# Patient Record
Sex: MALE | Race: WHITE | NOT HISPANIC OR LATINO | Employment: OTHER | URBAN - METROPOLITAN AREA
[De-identification: names, ages, dates, MRNs, and addresses within clinical notes are randomized per-mention and may not be internally consistent; named-entity substitution may affect disease eponyms.]

---

## 2017-01-03 ENCOUNTER — HOSPITAL ENCOUNTER (OUTPATIENT)
Dept: PHYSICAL THERAPY | Facility: HOSPITAL | Age: 67
Setting detail: THERAPIES SERIES
Discharge: HOME | End: 2017-01-03

## 2017-01-03 DIAGNOSIS — G89.29 CHRONIC BILATERAL LOW BACK PAIN WITH LEFT-SIDED SCIATICA: Primary | ICD-10-CM

## 2017-01-03 DIAGNOSIS — M54.42 CHRONIC BILATERAL LOW BACK PAIN WITH LEFT-SIDED SCIATICA: Primary | ICD-10-CM

## 2017-01-03 PROCEDURE — 97110 THERAPEUTIC EXERCISES: CPT

## 2017-01-03 NOTE — PROGRESS NOTES
Outpatient Physical Therapy Ortho Treatment Note  [facility]     Patient Name: Eugene Ames  : 1950  MRN: [id number]  Today's Date: 1/3/2017      Visit Date: 2017    Visit Dx:    ICD-10-CM ICD-9-CM   1. Chronic bilateral low back pain with left-sided sciatica M54.42 724.2    G89.29 724.3     338.29       There is no problem list on file for this patient.       Past Medical History   Diagnosis Date   • Allergic rhinitis    • ED (erectile dysfunction)    • Extremity pain    • Joint pain    • Low back pain    • Lumbosacral disc disease         Past Surgical History   Procedure Laterality Date   • Lumbar discectomy  2015     Left L4/5 (Sun)                             PT Assessment/Plan       17 1527 16 1346       PT Assessment    Assessment Comments Patient seems to tolerate new routine without increased pain.    -GB Patient is a little concerned about 3 successive days of travel, but he seems encouraged by slight progress and use of ice to control pain.  -GB     PT Plan    PT Plan Comments Follow up with care.  -GB Continue to progress.    -GB       User Key  (r) = Recorded By, (t) = Taken By, (c) = Cosigned By    Initials Name Provider Type    PIOTR Romero, PT Physical Therapist                    Exercises       17 1400          Subjective Comments    Subjective Comments Patient reports that he had difficulty from travels, but he is still hopeful to get an injection in before he leaves for [place] at the end of the month.  -GB      Subjective Pain    Pre-Treatment Pain Level 4  -GB      Post-Treatment Pain Level 5  -GB      Exercise 1    Exercise Name 1 Ther ex as per flow sheet with 3 alternating sets of 3 ''Wads'  -GB      Time (Minutes) 1 45  -GB      Treatment Type 1 Ther Ex  -GB        User Key  (r) = Recorded By, (t) = Taken By, (c) = Cosigned By    Initials Name Provider Type    PIOTR Romero, PT Physical Therapist                                PT OP Goals       12/27/16 1521          Time Calculation    PT Goal Re-Cert Due Date 01/12/16  -RB        User Key  (r) = Recorded By, (t) = Taken By, (c) = Cosigned By    Initials Name Provider Type    RB Edith Dowell, PT Physical Therapist                    Time Calculation:   Start Time: 1345  Total Timed Code Minutes- PT: 45 minute(s)    Therapy Charges for Today     Code Description Service Date Service Provider Modifiers Qty    85157507587 HC PT THER PROC EA 15 MIN 1/3/2017 Shahbaz Romero, PT GP 3                    Shahbaz Romero, PT  1/3/2017

## 2017-01-04 DIAGNOSIS — M51.26 HERNIATED LUMBAR INTERVERTEBRAL DISC: ICD-10-CM

## 2017-01-04 RX ORDER — DICLOFENAC SODIUM 75 MG/1
75 TABLET, DELAYED RELEASE ORAL 2 TIMES DAILY
Qty: 30 TABLET | Refills: 1 | Status: SHIPPED | OUTPATIENT
Start: 2017-01-04 | End: 2017-03-08 | Stop reason: SDUPTHER

## 2017-01-04 NOTE — TELEPHONE ENCOUNTER
Provider:  Dino  Caller:     Phone #:    Surgery:  L4-5 Discectomy  Surgery Date:  8/2015  Last visit:  12/3/16     SUNITA:         Reason for call:       Diclofenac refill request

## 2017-01-05 ENCOUNTER — HOSPITAL ENCOUNTER (OUTPATIENT)
Dept: PHYSICAL THERAPY | Facility: HOSPITAL | Age: 67
Setting detail: THERAPIES SERIES
Discharge: HOME | End: 2017-01-05

## 2017-01-05 DIAGNOSIS — G89.29 CHRONIC BILATERAL LOW BACK PAIN WITH LEFT-SIDED SCIATICA: Primary | ICD-10-CM

## 2017-01-05 DIAGNOSIS — M54.42 CHRONIC BILATERAL LOW BACK PAIN WITH LEFT-SIDED SCIATICA: Primary | ICD-10-CM

## 2017-01-05 PROCEDURE — 97110 THERAPEUTIC EXERCISES: CPT

## 2017-01-05 NOTE — PROGRESS NOTES
Outpatient Physical Therapy Ortho Treatment Note  [facility]     Patient Name: Eugene Ames  : 1950  MRN: [id number]  Today's Date: 2017      Visit Date: 2017    Visit Dx:    ICD-10-CM ICD-9-CM   1. Chronic bilateral low back pain with left-sided sciatica M54.42 724.2    G89.29 724.3     338.29       There is no problem list on file for this patient.       Past Medical History   Diagnosis Date   • Allergic rhinitis    • ED (erectile dysfunction)    • Extremity pain    • Joint pain    • Low back pain    • Lumbosacral disc disease         Past Surgical History   Procedure Laterality Date   • Lumbar discectomy  2015     Left L4/5 (Sun)                             PT Assessment/Plan       17 1557 17 1527       PT Assessment    Assessment Comments Patient has only 2 episodes of increased leg symptoms with push ups and rows, likely from moving lumbar spine into extended position.  -GB Patient seems to tolerate new routine without increased pain.    -GB     PT Plan    PT Plan Comments Continue with current POC.  -GB Follow up with care.  -GB       User Key  (r) = Recorded By, (t) = Taken By, (c) = Cosigned By    Initials Name Provider Type    PIOTR Romero, PT Physical Therapist                    Exercises       17 1300          Subjective Comments    Subjective Comments Patient reports that the duration of his pain does not last as long.  He has required less medication to help control pain.  -GB      Subjective Pain    Pre-Treatment Pain Level 4  -GB      Post-Treatment Pain Level 4  -GB      Exercise 1    Exercise Name 1 Ther ex as per flow sheet with 3 alternating sets of 3 ''Wads'  -GB      Time (Minutes) 1 45  -GB      Treatment Type 1 Ther Ex  -GB        User Key  (r) = Recorded By, (t) = Taken By, (c) = Cosigned By    Initials Name Provider Type    PIOTR Romero, PT Physical Therapist                        Manual Rx (last 36 hours)      Manual  Treatments       01/05/17 1300          Manual Rx 1    Manual Rx 1 Location Hip/Pelvis  -GB      Manual Rx 1 Type Roller (with 6 inch foam roll)  -GB      Manual Rx 1 Duration 1 min  -GB      Manual Rx 2    Manual Rx 2 Location Left LE nerve flossing  -GB      Manual Rx 2 Duration 6 reps  -GB        User Key  (r) = Recorded By, (t) = Taken By, (c) = Cosigned By    Initials Name Provider Type    PIOTR Romero, PT Physical Therapist                PT OP Goals       12/27/16 1521          Time Calculation    PT Goal Re-Cert Due Date 01/12/16  -RB        User Key  (r) = Recorded By, (t) = Taken By, (c) = Cosigned By    Initials Name Provider Type    RB Edith Dowell, PT Physical Therapist                Therapy Education       01/05/17 1556    Therapy Education    Given HEP   written flow sheet of today's program issued to patient  -GB    Program Modified  -GB    How Provided Verbal;Written  -GB    Provided to Patient  -GB    Level of Understanding Verbalized  -GB      User Key  (r) = Recorded By, (t) = Taken By, (c) = Cosigned By    Initials Name Provider Type    PIOTR Romero, PT Physical Therapist                Time Calculation:   Start Time: 1257  Total Timed Code Minutes- PT: 47 minute(s)    Therapy Charges for Today     Code Description Service Date Service Provider Modifiers Qty    80721137269 HC PT THER PROC EA 15 MIN 1/5/2017 Shahbaz Romero, PT GP 3                    Shahbaz Romero, PT  1/5/2017

## 2017-01-10 ENCOUNTER — HOSPITAL ENCOUNTER (OUTPATIENT)
Dept: PHYSICAL THERAPY | Facility: HOSPITAL | Age: 67
Setting detail: THERAPIES SERIES
Discharge: HOME | End: 2017-01-10

## 2017-01-10 PROCEDURE — 97110 THERAPEUTIC EXERCISES: CPT

## 2017-01-10 NOTE — PROGRESS NOTES
Outpatient Physical Therapy Ortho Treatment Note  [facility]     Patient Name: Eugene Ames  : 1950  MRN: [id number]  Today's Date: 1/10/2017      Visit Date: 01/10/2017    Visit Dx:  No diagnosis found.    There is no problem list on file for this patient.       Past Medical History   Diagnosis Date   • Allergic rhinitis    • ED (erectile dysfunction)    • Extremity pain    • Joint pain    • Low back pain    • Lumbosacral disc disease         Past Surgical History   Procedure Laterality Date   • Lumbar discectomy  2015     Left L4/5 (Sun)                             PT Assessment/Plan       01/10/17 1347 17 1557 17 1527    PT Assessment    Assessment Comments Patient was able to complete routine today without any observed distress and without any limitations.  -GB Patient has only 2 episodes of increased leg symptoms with push ups and rows, likely from moving lumbar spine into extended position.  -GB Patient seems to tolerate new routine without increased pain.    -GB    PT Plan    PT Plan Comments Follow up with care.  -GB Continue with current POC.  -GB Follow up with care.  -GB      User Key  (r) = Recorded By, (t) = Taken By, (c) = Cosigned By    Initials Name Provider Type    PIOTR Romero, PT Physical Therapist                    Exercises       01/10/17 1300          Subjective Comments    Subjective Comments Patient did not have a response from the MD office regarding a follow up injection.    -GB      Subjective Pain    Pre-Treatment Pain Level 4  -GB      Post-Treatment Pain Level 4  -GB      Exercise 1    Exercise Name 1 Ther ex as per flow sheet with 3 alternating sets of 3 ''Wads'  -GB      Time (Minutes) 1 45  -GB      Treatment Type 1 Ther Ex  -GB        User Key  (r) = Recorded By, (t) = Taken By, (c) = Cosigned By    Initials Name Provider Type    PIOTR Romero, PT Physical Therapist                                   Therapy Education        01/05/17 1556    Therapy Education    Given HEP   written flow sheet of today's program issued to patient  -GB    Program Modified  -GB    How Provided Verbal;Written  -GB    Provided to Patient  -GB    Level of Understanding Verbalized  -GB      User Key  (r) = Recorded By, (t) = Taken By, (c) = Cosigned By    Initials Name Provider Type    PIOTR Romero, PT Physical Therapist                Time Calculation:   Start Time: 1300  Total Timed Code Minutes- PT: 45 minute(s)    Therapy Charges for Today     Code Description Service Date Service Provider Modifiers Qty    83213938343 HC PT THER PROC EA 15 MIN 1/10/2017 Shahbaz Romero, PT GP 3                    Shahbaz Romero, PT  1/10/2017

## 2017-01-12 ENCOUNTER — HOSPITAL ENCOUNTER (OUTPATIENT)
Dept: PHYSICAL THERAPY | Facility: HOSPITAL | Age: 67
Setting detail: THERAPIES SERIES
Discharge: HOME | End: 2017-01-12

## 2017-01-12 DIAGNOSIS — G89.29 CHRONIC BILATERAL LOW BACK PAIN WITH LEFT-SIDED SCIATICA: Primary | ICD-10-CM

## 2017-01-12 DIAGNOSIS — M54.42 CHRONIC BILATERAL LOW BACK PAIN WITH LEFT-SIDED SCIATICA: Primary | ICD-10-CM

## 2017-01-12 PROCEDURE — G8979 MOBILITY GOAL STATUS: HCPCS

## 2017-01-12 PROCEDURE — 97110 THERAPEUTIC EXERCISES: CPT

## 2017-01-12 PROCEDURE — G8978 MOBILITY CURRENT STATUS: HCPCS

## 2017-01-12 PROCEDURE — G8981 BODY POS CURRENT STATUS: HCPCS

## 2017-01-12 PROCEDURE — G8982 BODY POS GOAL STATUS: HCPCS

## 2017-01-12 PROCEDURE — 97140 MANUAL THERAPY 1/> REGIONS: CPT

## 2017-01-12 NOTE — PROGRESS NOTES
Outpatient Physical Therapy Ortho Re-Assessment  [facility]     Patient Name: Eugene Ames  : 1950  MRN: [id number]  Today's Date: 2017      Visit Date: 2017    There is no problem list on file for this patient.       Past Medical History   Diagnosis Date   • Allergic rhinitis    • ED (erectile dysfunction)    • Extremity pain    • Joint pain    • Low back pain    • Lumbosacral disc disease         Past Surgical History   Procedure Laterality Date   • Lumbar discectomy  2015     Left L4/5 (Sun)       Visit Dx:     ICD-10-CM ICD-9-CM   1. Chronic bilateral low back pain with left-sided sciatica M54.42 724.2    G89.29 724.3     338.29                 PT Ortho       17 1500    Subjective Comments    Subjective Comments Patient feels that he is improved by 25%-50%.  -GB    Subjective Pain    Able to rate subjective pain? yes  -GB    Pre-Treatment Pain Level 2  -GB    Post-Treatment Pain Level 2  -GB    Myotomal Screen- Lower Quarter Clearing    Hip flexion (L2) Bilateral:;5 (Normal)  -GB    Knee extension (L3) Bilateral:;5 (Normal)  -GB    Ankle DF (L4) Bilateral:;5 (Normal)  -GB    Great toe extension (L5) Bilateral:;5 (Normal)  -GB    Ankle PF (S1) Bilateral:;5 (Normal)  -GB    Knee flexion (S2) Right:;5 (Normal);Left:;4+ (Good +)  -GB    SI/Hip Screen- Lower Quarter Clearing    ASIS compression Bilateral:;Negative  -GB    Lumbosacral Palpation    SI --   Patient is without tenderness  -GB    Lumbosacral Segment Left:;Tender;Guarded/taut   Minimal left rotation at L4,5  -GB    Lumbosacral Accessory Motions    Lumbosacral Accessory Motions Tested? --   Increased left leg symptoms with extension and left lat flex  -GB    Trunk    Flexion AROM Deficit 55  -GB    Extension AROM Deficit 32  -GB    Lt Lat Flexion AROM Deficit 25  -GB    Right Lateral Flexion AROM Deficit 30  -GB    Lt Rotation AROM Deficit 30  -GB    Right Rotation AROM Deficit 30  -GB    MMT (Manual Muscle Testing)     General MMT Assessment Detail (B) Hip ABDuction = 5/5, (B) Adduction = 5/5  -GB    Lower Extremity Flexibility    Hamstrings Bilateral:;Moderately limited  -GB      User Key  (r) = Recorded By, (t) = Taken By, (c) = Cosigned By    Initials Name Provider Type    PIOTR Romero, GOMEZ Physical Therapist                            Therapy Education       01/12/17 1607    Therapy Education    Given Symptoms/condition management   Options and consdierations in POC with PT, HEP and follow up with MD CHIN    Program Reinforced  -GB    How Provided Verbal  -GB    Provided to Patient  -GB    Level of Understanding Verbalized;Teach back education performed  -GB      User Key  (r) = Recorded By, (t) = Taken By, (c) = Cosigned By    Initials Name Provider Type    PIOTR Romero, GOMEZ Physical Therapist                PT OP Goals       01/12/17 1600          PT Short Term Goals    STG Date to Achieve 12/29/16  -GB      STG 1 Patient is independent with HEP for flexibility and initial strengthening.  -GB      STG 1 Progress Met  -GB      STG 2 Patient is able to diminish or centralize left leg symptoms 25% of occurrence.  -GB      STG 2 Progress Ongoing;Partially Met  -GB      Long Term Goals    LTG Date to Achieve 01/27/17  -GB      LTG 1 Patient is independent with long term HEP for strengthening and stabilization.  -GB      LTG 1 Progress Partially Met  -GB      LTG 2 Pain is reduced by at least 50% with frequency or intensity of symptoms.  -GB      LTG 2 Progress Progressing;Partially Met  -GB      LTG 2 Progress Comments Reports 25%-50% improved  -GB      LTG 3 Patient LEFS score is improved by at least 20 points.  -GB      LTG 3 Progress Ongoing;Not Met  -GB      Time Calculation    PT Goal Re-Cert Due Date 02/10/17  -GB        User Key  (r) = Recorded By, (t) = Taken By, (c) = Cosigned By    Initials Name Provider Type    PIOTR Romero PT Physical Therapist                PT Assessment/Plan        01/12/17 1611 01/10/17 1347       PT Assessment    Functional Limitations Performance in self-care ADL;Limitations in community activities;Limitations in functional capacity and performance;Impaired gait;Limitation in home management;Performance in leisure activities  -GB      Impairments Range of motion;Joint mobility;Pain;Muscle strength;Sensation;Peripheral nerve integrity;Endurance  -GB      Assessment Comments Patient has shown some signs of progress, but he still has altered symptoms in left leg, most commonly recreated with extension and left lateral flexion motions.  -GB Patient was able to complete routine today without any observed distress and without any limitations.  -GB     Please refer to paper survey for additional self-reported information Yes  -GB      Rehab Potential Good  -GB      Patient/caregiver participated in establishment of treatment plan and goals Yes  -GB      Patient would benefit from skilled therapy intervention Yes  -GB      PT Plan    PT Frequency 1x/week  -GB      Predicted Duration of Therapy Intervention (days/wks) 3 weeks  -GB      PT Plan Comments Finalize HEP and switch emphasis to HEP in preparation for travels to [place].  -GB Follow up with care.  -GB       User Key  (r) = Recorded By, (t) = Taken By, (c) = Cosigned By    Initials Name Provider Type    PIOTR Romero, PT Physical Therapist                  Exercises       01/12/17 1500          Subjective Comments    Subjective Comments Patient feels that he is improved by 25%-50%.  -GB      Subjective Pain    Able to rate subjective pain? yes  -GB      Pre-Treatment Pain Level 2  -GB      Post-Treatment Pain Level 2  -GB        User Key  (r) = Recorded By, (t) = Taken By, (c) = Cosigned By    Initials Name Provider Type    PIOTR Romero, PT Physical Therapist           Manual Rx (last 36 hours)      Manual Treatments       01/12/17 1500          Manual Rx 1    Manual Rx 1 Location Pelvis  -GB      Manual  Rx 1 Type Ham/quad for minimal left posterior rotation of [place]  -GB      Manual Rx 2    Manual Rx 2 Location Left leg distraction  -GB      Manual Rx 2 Duration 2 min  -GB      Manual Rx 3    Manual Rx 3 Location L4, L5  -GB      Manual Rx 3 Type MET for left rotation at L4-5  -GB      Manual Rx 3 Duration 2 x 4  -GB        User Key  (r) = Recorded By, (t) = Taken By, (c) = Cosigned By    Initials Name Provider Type    PIOTR Romero, PT Physical Therapist                            Time Calculation:   Start Time: 1300  Total Timed Code Minutes- PT: 40 minute(s)     Therapy Charges for Today     Code Description Service Date Service Provider Modifiers Qty    63367929642 HC PT MOBILITY CURRENT 1/12/2017 Shahbaz Romero, PT GP, CL 1    78719759011 HC PT MOBILITY PROJECTED 1/12/2017 Shahbaz Romero, PT GP, CK 1    49497290411 HC PT CHNG MAIN POS CURRENT 1/12/2017 Shahbaz Romero, PT GP, CL 1    11313826945 HC PT CHNG MAIN POS PROJECTED 1/12/2017 Shahbaz Romero, PT GP, CK 1    91920944046 HC PT THER PROC EA 15 MIN 1/12/2017 Shahbaz Romero, PT GP 2    56127068405 HC PT MANUAL THERAPY EA 15 MIN 1/12/2017 Shahbaz Romero, PT GP 1          PT G-Codes  PT Professional Judgement Used?: Yes  Outcome Measure Options: Lower Extremity Functional Scale (LEFS)  Score: 19/80  Functional Limitation: Changing and maintaining body position, Mobility: Walking and moving around  Mobility: Walking and Moving Around Current Status (): At least 60 percent but less than 80 percent impaired, limited or restricted  Mobility: Walking and Moving Around Goal Status (): At least 40 percent but less than 60 percent impaired, limited or restricted  Changing and Maintaining Body Position Current Status (): At least 60 percent but less than 80 percent impaired, limited or restricted  Changing and Maintaining Body Position Goal Status (): At least 40 percent but less than 60 percent impaired,  limited or restricted         Shahbaz Romero, PT  1/12/2017

## 2017-01-17 ENCOUNTER — HOSPITAL ENCOUNTER (OUTPATIENT)
Dept: PHYSICAL THERAPY | Facility: HOSPITAL | Age: 67
Setting detail: THERAPIES SERIES
Discharge: HOME | End: 2017-01-17

## 2017-01-17 DIAGNOSIS — M54.42 CHRONIC BILATERAL LOW BACK PAIN WITH LEFT-SIDED SCIATICA: Primary | ICD-10-CM

## 2017-01-17 DIAGNOSIS — G89.29 CHRONIC BILATERAL LOW BACK PAIN WITH LEFT-SIDED SCIATICA: Primary | ICD-10-CM

## 2017-01-17 PROCEDURE — 97110 THERAPEUTIC EXERCISES: CPT

## 2017-01-17 NOTE — PROGRESS NOTES
Outpatient Physical Therapy Ortho Treatment Note  [facility]     Patient Name: Eugene Ames  : 1950  MRN: [id number]  Today's Date: 2017      Visit Date: 2017    Visit Dx:    ICD-10-CM ICD-9-CM   1. Chronic bilateral low back pain with left-sided sciatica M54.42 724.2    G89.29 724.3     338.29       There is no problem list on file for this patient.       Past Medical History   Diagnosis Date   • Allergic rhinitis    • ED (erectile dysfunction)    • Extremity pain    • Joint pain    • Low back pain    • Lumbosacral disc disease         Past Surgical History   Procedure Laterality Date   • Lumbar discectomy  2015     Left L4/5 (Sun)                             PT Assessment/Plan       17 1611 17 1611       PT Assessment    Functional Limitations  Performance in self-care ADL;Limitations in community activities;Limitations in functional capacity and performance;Impaired gait;Limitation in home management;Performance in leisure activities  -GB     Impairments  Range of motion;Joint mobility;Pain;Muscle strength;Sensation;Peripheral nerve integrity;Endurance  -GB     Assessment Comments Patient has no complaints of pain during exercises and he does well with modificatinos made for HEP.  -GB Patient has shown some signs of progress, but he still has altered symptoms in left leg, most commonly recreated with extension and left lateral flexion motions.  -GB     Please refer to paper survey for additional self-reported information  Yes  -GB     Rehab Potential  Good  -GB     Patient/caregiver participated in establishment of treatment plan and goals  Yes  -GB     Patient would benefit from skilled therapy intervention  Yes  -GB     PT Plan    PT Frequency  1x/week  -GB     Predicted Duration of Therapy Intervention (days/wks)  3 weeks  -GB     PT Plan Comments Follow up with care prior to his travels.  -GB Finalize HEP and switch emphasis to HEP in preparation for travels to  [place].  -GB       User Key  (r) = Recorded By, (t) = Taken By, (c) = Cosigned By    Initials Name Provider Type    PIOTR Romero, PT Physical Therapist                    Exercises       01/17/17 1300          Subjective Comments    Subjective Comments Patient reports that he did have follow up communication with his Pain Managment MD and he is scheduled for an injection tomorrow.  -GB      Subjective Pain    Able to rate subjective pain? yes  -GB      Pre-Treatment Pain Level 3  -GB      Post-Treatment Pain Level 3  -GB      Exercise 1    Exercise Name 1 Ther ex in clinical setting as per flow sheet  -GB      Time (Minutes) 1 45  -GB      Treatment Type 1 Ther Ex  -GB        User Key  (r) = Recorded By, (t) = Taken By, (c) = Cosigned By    Initials Name Provider Type    PIOTR Romero, PT Physical Therapist                               PT OP Goals       01/12/17 1600          PT Short Term Goals    STG Date to Achieve 12/29/16  -GB      STG 1 Patient is independent with HEP for flexibility and initial strengthening.  -GB      STG 1 Progress Met  -GB      STG 2 Patient is able to diminish or centralize left leg symptoms 25% of occurrence.  -GB      STG 2 Progress Ongoing;Partially Met  -GB      Long Term Goals    LTG Date to Achieve 01/27/17  -GB      LTG 1 Patient is independent with long term HEP for strengthening and stabilization.  -GB      LTG 1 Progress Partially Met  -GB      LTG 2 Pain is reduced by at least 50% with frequency or intensity of symptoms.  -GB      LTG 2 Progress Progressing;Partially Met  -GB      LTG 2 Progress Comments Reports 25%-50% improved  -GB      LTG 3 Patient LEFS score is improved by at least 20 points.  -GB      LTG 3 Progress Ongoing;Not Met  -GB      Time Calculation    PT Goal Re-Cert Due Date 02/10/17  -GB        User Key  (r) = Recorded By, (t) = Taken By, (c) = Cosigned By    Initials Name Provider Type    PIOTR Romero PT Physical Therapist                 Therapy Education       01/17/17 1610    Therapy Education    Given --   A written flow sheet is issued to patient replicating routine as performed in clinic today and as it may be replicated while on travels  -GB    Program Progressed  -GB    How Provided Verbal;Demonstration;Written  -GB    Provided to Patient  -GB    Level of Understanding Verbalized;Teach back education performed  -GB      01/12/17 1607    Therapy Education    Given Symptoms/condition management   Options and consdierations in POC with PT, HEP and follow up with MD  -GB    Program Reinforced  -GB    How Provided Verbal  -GB    Provided to Patient  -GB    Level of Understanding Verbalized;Teach back education performed  -GB      User Key  (r) = Recorded By, (t) = Taken By, (c) = Cosigned By    Initials Name Provider Type    PIOTR Romero, PT Physical Therapist                Time Calculation:   Start Time: 1348  Total Timed Code Minutes- PT: 45 minute(s)    Therapy Charges for Today     Code Description Service Date Service Provider Modifiers Qty    29420276779 HC PT THER PROC EA 15 MIN 1/17/2017 Shahbaz Romero, PT GP 3                    Shahbaz Romero, PT  1/17/2017

## 2017-01-18 ENCOUNTER — OUTSIDE FACILITY SERVICE (OUTPATIENT)
Dept: PAIN MEDICINE | Facility: CLINIC | Age: 67
End: 2017-01-18

## 2017-01-18 PROCEDURE — 99152 MOD SED SAME PHYS/QHP 5/>YRS: CPT | Performed by: ANESTHESIOLOGY

## 2017-01-18 PROCEDURE — 64483 NJX AA&/STRD TFRM EPI L/S 1: CPT | Performed by: ANESTHESIOLOGY

## 2017-01-19 ENCOUNTER — HOSPITAL ENCOUNTER (OUTPATIENT)
Dept: PHYSICAL THERAPY | Facility: HOSPITAL | Age: 67
Setting detail: THERAPIES SERIES
Discharge: HOME | End: 2017-01-19

## 2017-01-19 DIAGNOSIS — M54.42 CHRONIC BILATERAL LOW BACK PAIN WITH LEFT-SIDED SCIATICA: Primary | ICD-10-CM

## 2017-01-19 DIAGNOSIS — G89.29 CHRONIC BILATERAL LOW BACK PAIN WITH LEFT-SIDED SCIATICA: Primary | ICD-10-CM

## 2017-01-19 PROCEDURE — G8978 MOBILITY CURRENT STATUS: HCPCS

## 2017-01-19 PROCEDURE — G8983 BODY POS D/C STATUS: HCPCS

## 2017-01-19 PROCEDURE — G8981 BODY POS CURRENT STATUS: HCPCS

## 2017-01-19 PROCEDURE — G8979 MOBILITY GOAL STATUS: HCPCS

## 2017-01-19 PROCEDURE — G8982 BODY POS GOAL STATUS: HCPCS

## 2017-01-19 PROCEDURE — 97110 THERAPEUTIC EXERCISES: CPT

## 2017-01-19 NOTE — THERAPY DISCHARGE NOTE
Outpatient Physical Therapy Rehab Program Treatment/Discharge  [facility]     Patient Name: Eugene Ames  : 1950  MRN: [id number]  Today's Date: 2017      Visit Date: 2017    Visit Dx:    ICD-10-CM ICD-9-CM   1. Chronic bilateral low back pain with left-sided sciatica M54.42 724.2    G89.29 724.3     338.29       There is no problem list on file for this patient.       Past Medical History   Diagnosis Date   • Allergic rhinitis    • ED (erectile dysfunction)    • Extremity pain    • Joint pain    • Low back pain    • Lumbosacral disc disease         Past Surgical History   Procedure Laterality Date   • Lumbar discectomy  2015     Left L4/5 (Sun)                               PT Assessment/Plan       17 1819 17 1611       PT Assessment    Assessment Comments Patient has progressed toward all goals and has met most goals.  He is prepared to be travelling out of [place] for 4-5 weeks and he feels comfortable with his HEP.  -GB Patient has no complaints of pain during exercises and he does well with modificatinos made for HEP.  -GB     PT Plan    PT Plan Comments Follow up with MD and Resume therapy after return from vacation wtih new MD order, if warranted.  -GB Follow up with care prior to his travels.  -GB       User Key  (r) = Recorded By, (t) = Taken By, (c) = Cosigned By    Initials Name Provider Type    GB Shahbaz Romero, PT Physical Therapist                    Exercises       17 1300          Subjective Comments    Subjective Comments Patient had an injection yesterday and he has noticed little soreness today.    -GB      Subjective Pain    Able to rate subjective pain? yes  -GB      Pre-Treatment Pain Level 2  -GB      Post-Treatment Pain Level 2  -GB      Exercise 1    Exercise Name 1 Ther ex in clinic is followed by flow sheet as issued for HEP with light supervision  -GB      Time (Minutes) 1 25  -GB      Treatment Type 1 Ther Ex  -GB      Exercise 2     Exercise Name 2 Addition of Ankle eversion  -GB      Equipment 2 Theraband  -GB      Resistance 2 Green  -GB      Reps 2 10  -GB        User Key  (r) = Recorded By, (t) = Taken By, (c) = Cosigned By    Initials Name Provider Type    PIOTR Romero, PT Physical Therapist                               PT OP Goals       01/19/17 1800 01/12/17 1600       PT Short Term Goals    STG Date to Achieve 12/29/16  -GB 12/29/16  -GB     STG 1 Patient is independent with HEP for flexibility and initial strengthening.  -GB Patient is independent with HEP for flexibility and initial strengthening.  -GB     STG 1 Progress Met  -GB Met  -GB     STG 2 Patient is able to diminish or centralize left leg symptoms 25% of occurrence.  -GB Patient is able to diminish or centralize left leg symptoms 25% of occurrence.  -GB     STG 2 Progress Met  -GB Ongoing;Partially Met  -GB     Long Term Goals    LTG Date to Achieve 01/27/17  -GB 01/27/17  -GB     LTG 1 Patient is independent with long term HEP for strengthening and stabilization.  -GB Patient is independent with long term HEP for strengthening and stabilization.  -GB     LTG 1 Progress Met  -GB Partially Met  -GB     LTG 2 Pain is reduced by at least 50% with frequency or intensity of symptoms.  -GB Pain is reduced by at least 50% with frequency or intensity of symptoms.  -GB     LTG 2 Progress Partially Met  -GB Progressing;Partially Met  -GB     LTG 2 Progress Comments  Reports 25%-50% improved  -GB     LTG 3 Patient LEFS score is improved by at least 20 points.  -GB Patient LEFS score is improved by at least 20 points.  -GB     LTG 3 Progress Not Met  -GB Ongoing;Not Met  -GB     Time Calculation    PT Goal Re-Cert Due Date  02/10/17  -GB       User Key  (r) = Recorded By, (t) = Taken By, (c) = Cosigned By    Initials Name Provider Type    PIOTR Romero, PT Physical Therapist                Therapy Education       01/19/17 1356    Therapy Education    Given HEP    Addition of Green t-band, and ankle eversion.  -GB    How Provided Verbal  -GB    Provided to Patient  -GB    Level of Understanding Verbalized  -GB      01/17/17 1610    Therapy Education    Given --   A written flow sheet is issued to patient replicating routine as performed in clinic today and as it may be replicated while on travels  -GB    Program Progressed  -GB    How Provided Verbal;Demonstration;Written  -GB    Provided to Patient  -GB    Level of Understanding Verbalized;Teach back education performed  -GB      User Key  (r) = Recorded By, (t) = Taken By, (c) = Cosigned By    Initials Name Provider Type    GB Shahbaz Romero, PT Physical Therapist                Time Calculation:   Start Time: 1300  Total Timed Code Minutes- PT: 45 minute(s)     Therapy Charges for Today     Code Description Service Date Service Provider Modifiers Qty    89330551521 HC PT MOBILITY CURRENT 1/19/2017 Shahbaz Romero, PT GP, CK 1    37267428906 HC PT MOBILITY PROJECTED 1/19/2017 Shahbaz Romero, PT GP, CK 1    73940418155 HC PT CHNG MAIN POS CURRENT 1/19/2017 Shahbaz Romero, PT GP, CK 1    68174463396 HC PT CHNG MAIN POS PROJECTED 1/19/2017 Shahbaz Romero, PT GP, CK 1    25621792389 HC PT CHNG MAIN POS DISCHARGE 1/19/2017 Shahbaz Romero, PT GP, CK 1    71200689828 HC PT THER PROC EA 15 MIN 1/19/2017 Shahbaz Romero, PT GP 3          PT G-Codes  PT Professional Judgement Used?: Yes  Functional Limitation: Changing and maintaining body position  Mobility: Walking and Moving Around Current Status (): At least 40 percent but less than 60 percent impaired, limited or restricted  Mobility: Walking and Moving Around Goal Status (): At least 40 percent but less than 60 percent impaired, limited or restricted  Changing and Maintaining Body Position Current Status (): At least 40 percent but less than 60 percent impaired, limited or restricted  Changing and Maintaining Body Position Goal  Status (): At least 40 percent but less than 60 percent impaired, limited or restricted  Changing and Maintaining Body Position Discharge Status (): At least 40 percent but less than 60 percent impaired, limited or restricted     OP PT Discharge Summary  Date of Discharge: 01/19/17  Reason for Discharge: Unable to participate  Outcomes Achieved: Patient able to partially acheive established goals  Discharge Instructions: Continue with HEP and follow up with MD as necessary.      Shahbaz Romero, PT  1/19/2017

## 2017-01-24 ENCOUNTER — APPOINTMENT (OUTPATIENT)
Dept: PHYSICAL THERAPY | Facility: HOSPITAL | Age: 67
End: 2017-01-24

## 2017-01-26 ENCOUNTER — APPOINTMENT (OUTPATIENT)
Dept: PHYSICAL THERAPY | Facility: HOSPITAL | Age: 67
End: 2017-01-26

## 2017-01-31 ENCOUNTER — APPOINTMENT (OUTPATIENT)
Dept: PHYSICAL THERAPY | Facility: HOSPITAL | Age: 67
End: 2017-01-31

## 2017-02-02 ENCOUNTER — APPOINTMENT (OUTPATIENT)
Dept: PHYSICAL THERAPY | Facility: HOSPITAL | Age: 67
End: 2017-02-02

## 2017-03-08 DIAGNOSIS — M51.26 HERNIATED LUMBAR INTERVERTEBRAL DISC: ICD-10-CM

## 2017-03-08 RX ORDER — DICLOFENAC SODIUM 75 MG/1
75 TABLET, DELAYED RELEASE ORAL 2 TIMES DAILY
Qty: 30 TABLET | Refills: 1 | Status: SHIPPED | OUTPATIENT
Start: 2017-03-08 | End: 2017-03-13

## 2017-03-08 NOTE — TELEPHONE ENCOUNTER
Provider:  Dino  Caller:   Eugene  Phone #:  [phone]  Surgery:  n/a  Surgery Date:  n/a  Last visit:   12/3/16    SUNITA:         Reason for call:       Diclofenac refill request

## 2017-03-13 ENCOUNTER — OFFICE VISIT (OUTPATIENT)
Dept: NEUROSURGERY | Facility: CLINIC | Age: 67
End: 2017-03-13

## 2017-03-13 ENCOUNTER — PREP FOR SURGERY (OUTPATIENT)
Dept: PAIN MEDICINE | Facility: CLINIC | Age: 67
End: 2017-03-13

## 2017-03-13 VITALS
RESPIRATION RATE: 18 BRPM | OXYGEN SATURATION: 98 % | TEMPERATURE: 97.7 F | DIASTOLIC BLOOD PRESSURE: 82 MMHG | SYSTOLIC BLOOD PRESSURE: 148 MMHG | HEART RATE: 74 BPM | WEIGHT: 172.8 LBS | HEIGHT: 68 IN | BODY MASS INDEX: 26.19 KG/M2

## 2017-03-13 DIAGNOSIS — M51.26 HNP (HERNIATED NUCLEUS PULPOSUS), LUMBAR: Primary | ICD-10-CM

## 2017-03-13 DIAGNOSIS — M51.26 HERNIATED LUMBAR INTERVERTEBRAL DISC: ICD-10-CM

## 2017-03-13 PROCEDURE — 99213 OFFICE O/P EST LOW 20 MIN: CPT | Performed by: NEUROLOGICAL SURGERY

## 2017-03-13 RX ORDER — METHOCARBAMOL 750 MG/1
750 TABLET, FILM COATED ORAL 3 TIMES DAILY
Qty: 30 TABLET | Refills: 1 | Status: SHIPPED | OUTPATIENT
Start: 2017-03-13 | End: 2017-04-25

## 2017-03-13 RX ORDER — OXYCODONE AND ACETAMINOPHEN 10; 325 MG/1; MG/1
1 TABLET ORAL EVERY 6 HOURS PRN
Qty: 60 TABLET | Refills: 0 | Status: SHIPPED | OUTPATIENT
Start: 2017-03-13 | End: 2017-04-25

## 2017-03-13 RX ORDER — SODIUM CHLORIDE, SODIUM LACTATE, POTASSIUM CHLORIDE, CALCIUM CHLORIDE 600; 310; 30; 20 MG/100ML; MG/100ML; MG/100ML; MG/100ML
100 INJECTION, SOLUTION INTRAVENOUS CONTINUOUS
Status: CANCELLED | OUTPATIENT
Start: 2017-03-13

## 2017-03-13 NOTE — PROGRESS NOTES
Eugene Ames  1950  [id number]                       CURRENT WORKING DIAGNOSIS:  [Recurrent herniated intervertebral disc L4-L5; left ]         MEDICAL HISTORY SINCE LAST ENCOUNTER:  [This patient has been through physical therapy pain management and medications to no avail.  He continues to have severe pain in his back rating to the left leg secondary to a recurrent herniated intervertebral disc demonstrated by lumbar MRI in November.  He continues to have an L5 radiculopathy associated with the weakness of the extensor hallucis longus. ]           Past Medical History   Diagnosis Date   • Allergic rhinitis    • ED (erectile dysfunction)    • Extremity pain    • Joint pain    • Low back pain    • Lumbosacral disc disease               Past Surgical History   Procedure Laterality Date   • Lumbar discectomy  08/07/2015     Left L4/5 (Sun)              Family History   Problem Relation Age of Onset   • Cancer Mother    • Heart attack Father               Social History     Social History   • Marital status:      Spouse name: N/A   • Number of children: N/A   • Years of education: N/A     Occupational History   • Not on file.     Social History Main Topics   • Smoking status: Never Smoker   • Smokeless tobacco: Not on file   • Alcohol use No   • Drug use: No   • Sexual activity: Not on file     Other Topics Concern   • Not on file     Social History Narrative            No Known Allergies           Current Outpatient Prescriptions:   •  diclofenac (VOLTAREN) 75 MG EC tablet, Take 1 tablet by mouth 2 (Two) Times a Day., Disp: 30 tablet, Rfl: 1  •  gabapentin (NEURONTIN) 100 MG capsule, Start 1 tab QHS for 3 days, then, BID for 3 days, then, TID for 3 days, then, cont. QID, Disp: 120 capsule, Rfl: 6  •  methocarbamol (ROBAXIN) 750 MG tablet, Take 1 tablet by mouth 3 (Three) Times a Day., Disp: 30 tablet, Rfl: 0  •  nortriptyline (PAMELOR) 10 MG capsule, Take 1 capsule by mouth At Night As Needed for sleep.  1-2 TABLETS AT BEDTIME, Disp: 60 capsule, Rfl: 6  •  oxyCODONE-acetaminophen (PERCOCET) 7.5-325 MG per tablet, Take 1 tablet by mouth Every 6 (Six) Hours As Needed for moderate pain (4-6)., Disp: 60 tablet, Rfl: 0         Review of Systems   Constitutional: Negative for activity change, appetite change, chills, diaphoresis, fatigue, fever and unexpected weight change.   HENT: Negative for congestion, dental problem, drooling, ear discharge, ear pain, facial swelling, hearing loss, mouth sores, nosebleeds, postnasal drip, rhinorrhea, sinus pressure, sneezing, sore throat, tinnitus, trouble swallowing and voice change.    Eyes: Negative for photophobia, pain, discharge, redness, itching and visual disturbance.   Respiratory: Negative for apnea, cough, choking, chest tightness, shortness of breath, wheezing and stridor.    Cardiovascular: Negative for chest pain, palpitations and leg swelling.   Gastrointestinal: Negative for abdominal distention, abdominal pain, anal bleeding, blood in stool, constipation, diarrhea, nausea, rectal pain and vomiting.   Endocrine: Negative for cold intolerance, heat intolerance, polydipsia, polyphagia and polyuria.   Genitourinary: Negative for decreased urine volume, difficulty urinating, dysuria, enuresis, flank pain, frequency, genital sores, hematuria and urgency.   Musculoskeletal: Positive for arthralgias, back pain, gait problem and myalgias. Negative for joint swelling, neck pain and neck stiffness.   Skin: Negative for color change, pallor, rash and wound.   Allergic/Immunologic: Negative for environmental allergies, food allergies and immunocompromised state.   Neurological: Negative for dizziness, tremors, seizures, syncope, facial asymmetry, speech difficulty, weakness, light-headedness, numbness and headaches.   Hematological: Negative for adenopathy. Does not bruise/bleed easily.   Psychiatric/Behavioral: Negative for agitation, behavioral problems, confusion, decreased  "concentration, dysphoric mood, hallucinations, self-injury, sleep disturbance and suicidal ideas. The patient is not nervous/anxious and is not hyperactive.    All other systems reviewed and are negative.              Vitals:    03/13/17 1045   BP: 148/82   BP Location: Left arm   Patient Position: Sitting   Pulse: 74   Resp: 18   Temp: 97.7 °F (36.5 °C)   TempSrc: Temporal Artery    SpO2: 98%   Weight: 172 lb 12.8 oz (78.4 kg)   Height: 68\" (172.7 cm)               EXAMINATION: He has a positive straight leg raising, weakness of the extensor hallucis longus and anterior tibialis on the left side.  The Achilles reflex is intact            MEDICAL DECISION MAKING: Having failed to respond to conservativism using all modalities surgery has been recommended           ASSESSMENT/DISPOSITION: We will schedule him for exploration and excision of recurrent herniated intravertebral disc L4-L5, left as soon as possible.              I APPRECIATE THE OPPORTUNITY OF THIS REFERRAL. PLEASE CALL IF ANY  QUESTIONS [phone]    Scribed for Gilson Sun MD by Sacha Yung CMA. 3/13/2017  11:01 AM             "

## 2017-03-14 ENCOUNTER — APPOINTMENT (OUTPATIENT)
Dept: PREADMISSION TESTING | Facility: HOSPITAL | Age: 67
End: 2017-03-14

## 2017-03-14 VITALS — BODY MASS INDEX: 25.73 KG/M2 | WEIGHT: 169.75 LBS | HEIGHT: 68 IN

## 2017-03-14 DIAGNOSIS — M51.26 HNP (HERNIATED NUCLEUS PULPOSUS), LUMBAR: ICD-10-CM

## 2017-03-14 LAB
ANION GAP SERPL CALCULATED.3IONS-SCNC: 0 MMOL/L (ref 3–11)
BUN BLD-MCNC: 15 MG/DL (ref 9–23)
BUN/CREAT SERPL: 16.7 (ref 7–25)
CALCIUM SPEC-SCNC: 11.2 MG/DL (ref 8.7–10.4)
CHLORIDE SERPL-SCNC: 102 MMOL/L (ref 99–109)
CO2 SERPL-SCNC: 36 MMOL/L (ref 20–31)
CREAT BLD-MCNC: 0.9 MG/DL (ref 0.6–1.3)
DEPRECATED RDW RBC AUTO: 40.6 FL (ref 37–54)
ERYTHROCYTE [DISTWIDTH] IN BLOOD BY AUTOMATED COUNT: 11.9 % (ref 11.3–14.5)
GFR SERPL CREATININE-BSD FRML MDRD: 84 ML/MIN/1.73
GLUCOSE BLD-MCNC: 99 MG/DL (ref 70–100)
HCT VFR BLD AUTO: 42.5 % (ref 38.9–50.9)
HGB BLD-MCNC: 15.1 G/DL (ref 13.1–17.5)
MCH RBC QN AUTO: 33.3 PG (ref 27–31)
MCHC RBC AUTO-ENTMCNC: 35.5 G/DL (ref 32–36)
MCV RBC AUTO: 93.6 FL (ref 80–99)
MRSA DNA SPEC QL NAA+PROBE: NEGATIVE
PLATELET # BLD AUTO: 185 10*3/MM3 (ref 150–450)
PMV BLD AUTO: 9.2 FL (ref 6–12)
POTASSIUM BLD-SCNC: 4.4 MMOL/L (ref 3.5–5.5)
RBC # BLD AUTO: 4.54 10*6/MM3 (ref 4.2–5.76)
SODIUM BLD-SCNC: 138 MMOL/L (ref 132–146)
WBC NRBC COR # BLD: 5.13 10*3/MM3 (ref 3.5–10.8)

## 2017-03-14 PROCEDURE — 36415 COLL VENOUS BLD VENIPUNCTURE: CPT

## 2017-03-14 PROCEDURE — 85027 COMPLETE CBC AUTOMATED: CPT | Performed by: ANESTHESIOLOGY

## 2017-03-14 PROCEDURE — 80048 BASIC METABOLIC PNL TOTAL CA: CPT | Performed by: PHYSICIAN ASSISTANT

## 2017-03-14 PROCEDURE — 87641 MR-STAPH DNA AMP PROBE: CPT | Performed by: PHYSICIAN ASSISTANT

## 2017-03-14 NOTE — DISCHARGE INSTRUCTIONS
The following information and instructions were given:    NPO after MN except sips of water with routine prescribed medication (except blood thinner, diabetes, or weight reducing medication) unless otherwise instructed by your physician.  Do not eat, drink, smoke or chew gum after MN the night before surgery. This also includes no mints.    DO NOT shave, wear makeup or dark nail polish.    Remove all jewelry (advised to go to jeweler if unable to remove).    Leave anything you consider valuable at home.    Leave your suitcase in the car until after your surgery.    Bring the following with you (if applicable)   -picture ID and insurance cards   -Co-pay/deductible required by insurance   -Medications in the original bottles (not a list) including all over-the-counter  medications if not brought to PAT   -Copy of advance directive, living will or power of  documents if not  brought to PAT   -CPAP or BIPAP mask and tubing (do not bring machine)   -Skin prep instructions sheet   -PAT Pass   Education booklet, brochure, handout or binder given to patient.    Pain Control After Surgery handout given to patient.    Respirex use (handout given to patient) and pneumonia prevention.    Signs and Symptoms of infection.    DVT Prevention stressing the importance of ambulation.    Patient to apply Chlorhexadine wipes to surgical area (as instructed) the night before procedure and the AM of procedure.      \

## 2017-03-15 ENCOUNTER — APPOINTMENT (OUTPATIENT)
Dept: GENERAL RADIOLOGY | Facility: HOSPITAL | Age: 67
End: 2017-03-15

## 2017-03-15 ENCOUNTER — ANESTHESIA EVENT (OUTPATIENT)
Dept: PERIOP | Facility: HOSPITAL | Age: 67
End: 2017-03-15

## 2017-03-15 ENCOUNTER — HOSPITAL ENCOUNTER (OUTPATIENT)
Facility: HOSPITAL | Age: 67
Discharge: HOME | End: 2017-03-16
Attending: NEUROLOGICAL SURGERY | Admitting: NEUROLOGICAL SURGERY

## 2017-03-15 ENCOUNTER — APPOINTMENT (OUTPATIENT)
Dept: PREADMISSION TESTING | Facility: HOSPITAL | Age: 67
End: 2017-03-15

## 2017-03-15 ENCOUNTER — ANESTHESIA (OUTPATIENT)
Dept: PERIOP | Facility: HOSPITAL | Age: 67
End: 2017-03-15

## 2017-03-15 DIAGNOSIS — Z78.9 IMPAIRED MOBILITY AND ADLS: Primary | ICD-10-CM

## 2017-03-15 DIAGNOSIS — Z74.09 IMPAIRED MOBILITY AND ADLS: Primary | ICD-10-CM

## 2017-03-15 DIAGNOSIS — Z74.09 IMPAIRED FUNCTIONAL MOBILITY, BALANCE, GAIT, AND ENDURANCE: ICD-10-CM

## 2017-03-15 PROBLEM — M51.26 LUMBAR HERNIATED DISC: Status: ACTIVE | Noted: 2017-03-15

## 2017-03-15 PROBLEM — M51.26 HNP (HERNIATED NUCLEUS PULPOSUS), LUMBAR: Status: ACTIVE | Noted: 2017-03-15

## 2017-03-15 PROCEDURE — G0378 HOSPITAL OBSERVATION PER HR: HCPCS

## 2017-03-15 PROCEDURE — 63042 LAMINOTOMY SINGLE LUMBAR: CPT | Performed by: NEUROLOGICAL SURGERY

## 2017-03-15 PROCEDURE — 25810000003 POTASSIUM CHLORIDE PER 2 MEQ: Performed by: NEUROLOGICAL SURGERY

## 2017-03-15 PROCEDURE — 25010000003 CEFAZOLIN IN DEXTROSE 2-4 GM/100ML-% SOLUTION: Performed by: PHYSICIAN ASSISTANT

## 2017-03-15 PROCEDURE — 25010000002 HYDROMORPHONE PER 4 MG: Performed by: NURSE ANESTHETIST, CERTIFIED REGISTERED

## 2017-03-15 PROCEDURE — 25010000002 FENTANYL CITRATE (PF) 100 MCG/2ML SOLUTION: Performed by: NURSE ANESTHETIST, CERTIFIED REGISTERED

## 2017-03-15 PROCEDURE — 25010000003 CEFAZOLIN IN DEXTROSE 2-4 GM/100ML-% SOLUTION: Performed by: NEUROLOGICAL SURGERY

## 2017-03-15 PROCEDURE — 72020 X-RAY EXAM OF SPINE 1 VIEW: CPT

## 2017-03-15 PROCEDURE — 63710000001 DEXAMETHASONE PER 0.25 MG: Performed by: NEUROLOGICAL SURGERY

## 2017-03-15 RX ORDER — FAMOTIDINE 20 MG/1
20 TABLET, FILM COATED ORAL 2 TIMES DAILY
Status: DISCONTINUED | OUTPATIENT
Start: 2017-03-15 | End: 2017-03-16 | Stop reason: HOSPADM

## 2017-03-15 RX ORDER — ATRACURIUM BESYLATE 10 MG/ML
INJECTION, SOLUTION INTRAVENOUS AS NEEDED
Status: SHIPPED | OUTPATIENT
Start: 2017-03-15

## 2017-03-15 RX ORDER — SODIUM CHLORIDE, SODIUM LACTATE, POTASSIUM CHLORIDE, CALCIUM CHLORIDE 600; 310; 30; 20 MG/100ML; MG/100ML; MG/100ML; MG/100ML
9 INJECTION, SOLUTION INTRAVENOUS CONTINUOUS
Status: DISCONTINUED | OUTPATIENT
Start: 2017-03-15 | End: 2017-03-15

## 2017-03-15 RX ORDER — DEXAMETHASONE SODIUM PHOSPHATE 4 MG/ML
4 INJECTION, SOLUTION INTRA-ARTICULAR; INTRALESIONAL; INTRAMUSCULAR; INTRAVENOUS; SOFT TISSUE EVERY 6 HOURS SCHEDULED
Status: DISCONTINUED | OUTPATIENT
Start: 2017-03-15 | End: 2017-03-16 | Stop reason: HOSPADM

## 2017-03-15 RX ORDER — PROMETHAZINE HYDROCHLORIDE 12.5 MG/1
12.5 SUPPOSITORY RECTAL EVERY 6 HOURS PRN
Status: DISCONTINUED | OUTPATIENT
Start: 2017-03-15 | End: 2017-03-16 | Stop reason: HOSPADM

## 2017-03-15 RX ORDER — GLYCOPYRROLATE 0.2 MG/ML
INJECTION INTRAMUSCULAR; INTRAVENOUS AS NEEDED
Status: SHIPPED | OUTPATIENT
Start: 2017-03-15

## 2017-03-15 RX ORDER — MIDAZOLAM HYDROCHLORIDE 1 MG/ML
INJECTION INTRAMUSCULAR; INTRAVENOUS AS NEEDED
Status: SHIPPED | OUTPATIENT
Start: 2017-03-15

## 2017-03-15 RX ORDER — HYDROMORPHONE HYDROCHLORIDE 1 MG/ML
0.5 INJECTION, SOLUTION INTRAMUSCULAR; INTRAVENOUS; SUBCUTANEOUS
Status: DISCONTINUED | OUTPATIENT
Start: 2017-03-15 | End: 2017-03-16 | Stop reason: HOSPADM

## 2017-03-15 RX ORDER — HYDRALAZINE HYDROCHLORIDE 20 MG/ML
5 INJECTION INTRAMUSCULAR; INTRAVENOUS
Status: DISCONTINUED | OUTPATIENT
Start: 2017-03-15 | End: 2017-03-15 | Stop reason: HOSPADM

## 2017-03-15 RX ORDER — ONDANSETRON 2 MG/ML
4 INJECTION INTRAMUSCULAR; INTRAVENOUS ONCE AS NEEDED
Status: DISCONTINUED | OUTPATIENT
Start: 2017-03-15 | End: 2017-03-15 | Stop reason: HOSPADM

## 2017-03-15 RX ORDER — PROMETHAZINE HYDROCHLORIDE 25 MG/ML
12.5 INJECTION, SOLUTION INTRAMUSCULAR; INTRAVENOUS EVERY 6 HOURS PRN
Status: DISCONTINUED | OUTPATIENT
Start: 2017-03-15 | End: 2017-03-16 | Stop reason: HOSPADM

## 2017-03-15 RX ORDER — PROMETHAZINE HYDROCHLORIDE 25 MG/1
25 TABLET ORAL ONCE AS NEEDED
Status: DISCONTINUED | OUTPATIENT
Start: 2017-03-15 | End: 2017-03-15 | Stop reason: HOSPADM

## 2017-03-15 RX ORDER — DOCUSATE SODIUM 100 MG/1
100 CAPSULE, LIQUID FILLED ORAL 2 TIMES DAILY
Status: DISCONTINUED | OUTPATIENT
Start: 2017-03-15 | End: 2017-03-16 | Stop reason: HOSPADM

## 2017-03-15 RX ORDER — PROMETHAZINE HYDROCHLORIDE 25 MG/ML
6.25 INJECTION, SOLUTION INTRAMUSCULAR; INTRAVENOUS ONCE AS NEEDED
Status: DISCONTINUED | OUTPATIENT
Start: 2017-03-15 | End: 2017-03-15 | Stop reason: HOSPADM

## 2017-03-15 RX ORDER — GABAPENTIN 100 MG/1
100 CAPSULE ORAL 4 TIMES DAILY
Status: DISCONTINUED | OUTPATIENT
Start: 2017-03-15 | End: 2017-03-16 | Stop reason: HOSPADM

## 2017-03-15 RX ORDER — PROMETHAZINE HYDROCHLORIDE 25 MG/1
25 SUPPOSITORY RECTAL ONCE AS NEEDED
Status: DISCONTINUED | OUTPATIENT
Start: 2017-03-15 | End: 2017-03-15 | Stop reason: HOSPADM

## 2017-03-15 RX ORDER — DEXAMETHASONE 4 MG/1
4 TABLET ORAL EVERY 6 HOURS SCHEDULED
Status: DISCONTINUED | OUTPATIENT
Start: 2017-03-15 | End: 2017-03-16 | Stop reason: HOSPADM

## 2017-03-15 RX ORDER — HYDROMORPHONE HYDROCHLORIDE 1 MG/ML
0.5 INJECTION, SOLUTION INTRAMUSCULAR; INTRAVENOUS; SUBCUTANEOUS
Status: DISCONTINUED | OUTPATIENT
Start: 2017-03-15 | End: 2017-03-15 | Stop reason: HOSPADM

## 2017-03-15 RX ORDER — PROMETHAZINE HYDROCHLORIDE 12.5 MG/1
12.5 TABLET ORAL EVERY 6 HOURS PRN
Status: DISCONTINUED | OUTPATIENT
Start: 2017-03-15 | End: 2017-03-16 | Stop reason: HOSPADM

## 2017-03-15 RX ORDER — METHOCARBAMOL 750 MG/1
750 TABLET, FILM COATED ORAL 3 TIMES DAILY
Status: DISCONTINUED | OUTPATIENT
Start: 2017-03-15 | End: 2017-03-16 | Stop reason: HOSPADM

## 2017-03-15 RX ORDER — FAMOTIDINE 10 MG/ML
20 INJECTION, SOLUTION INTRAVENOUS ONCE
Status: DISCONTINUED | OUTPATIENT
Start: 2017-03-15 | End: 2017-03-15 | Stop reason: HOSPADM

## 2017-03-15 RX ORDER — SODIUM CHLORIDE AND POTASSIUM CHLORIDE 150; 450 MG/100ML; MG/100ML
75 INJECTION, SOLUTION INTRAVENOUS CONTINUOUS
Status: DISCONTINUED | OUTPATIENT
Start: 2017-03-15 | End: 2017-03-16 | Stop reason: HOSPADM

## 2017-03-15 RX ORDER — FENTANYL CITRATE 50 UG/ML
50 INJECTION, SOLUTION INTRAMUSCULAR; INTRAVENOUS
Status: DISCONTINUED | OUTPATIENT
Start: 2017-03-15 | End: 2017-03-15 | Stop reason: HOSPADM

## 2017-03-15 RX ORDER — NEOSTIGMINE METHYLSULFATE 1 MG/ML
INJECTION, SOLUTION INTRAVENOUS AS NEEDED
Status: SHIPPED | OUTPATIENT
Start: 2017-03-15

## 2017-03-15 RX ORDER — DEXAMETHASONE SODIUM PHOSPHATE 10 MG/ML
INJECTION INTRAMUSCULAR; INTRAVENOUS AS NEEDED
Status: SHIPPED | OUTPATIENT
Start: 2017-03-15

## 2017-03-15 RX ORDER — LABETALOL HYDROCHLORIDE 5 MG/ML
5 INJECTION, SOLUTION INTRAVENOUS
Status: DISCONTINUED | OUTPATIENT
Start: 2017-03-15 | End: 2017-03-15 | Stop reason: HOSPADM

## 2017-03-15 RX ORDER — FENTANYL CITRATE 50 UG/ML
INJECTION, SOLUTION INTRAMUSCULAR; INTRAVENOUS AS NEEDED
Status: SHIPPED | OUTPATIENT
Start: 2017-03-15

## 2017-03-15 RX ORDER — SODIUM CHLORIDE, SODIUM LACTATE, POTASSIUM CHLORIDE, CALCIUM CHLORIDE 600; 310; 30; 20 MG/100ML; MG/100ML; MG/100ML; MG/100ML
100 INJECTION, SOLUTION INTRAVENOUS CONTINUOUS
Status: DISCONTINUED | OUTPATIENT
Start: 2017-03-15 | End: 2017-03-15

## 2017-03-15 RX ORDER — OXYCODONE AND ACETAMINOPHEN 10; 325 MG/1; MG/1
1 TABLET ORAL EVERY 6 HOURS PRN
Status: DISCONTINUED | OUTPATIENT
Start: 2017-03-15 | End: 2017-03-16 | Stop reason: HOSPADM

## 2017-03-15 RX ORDER — TEMAZEPAM 15 MG/1
30 CAPSULE ORAL NIGHTLY PRN
Status: DISCONTINUED | OUTPATIENT
Start: 2017-03-15 | End: 2017-03-16 | Stop reason: HOSPADM

## 2017-03-15 RX ORDER — CEFAZOLIN SODIUM 2 G/100ML
2 INJECTION, SOLUTION INTRAVENOUS EVERY 8 HOURS
Status: COMPLETED | OUTPATIENT
Start: 2017-03-15 | End: 2017-03-16

## 2017-03-15 RX ORDER — PROPOFOL 10 MG/ML
VIAL (ML) INTRAVENOUS AS NEEDED
Status: SHIPPED | OUTPATIENT
Start: 2017-03-15

## 2017-03-15 RX ORDER — MAGNESIUM HYDROXIDE 1200 MG/15ML
LIQUID ORAL AS NEEDED
Status: DISCONTINUED | OUTPATIENT
Start: 2017-03-15 | End: 2017-03-15 | Stop reason: HOSPADM

## 2017-03-15 RX ORDER — ONDANSETRON 2 MG/ML
INJECTION INTRAMUSCULAR; INTRAVENOUS AS NEEDED
Status: SHIPPED | OUTPATIENT
Start: 2017-03-15

## 2017-03-15 RX ORDER — FAMOTIDINE 10 MG/ML
20 INJECTION, SOLUTION INTRAVENOUS 2 TIMES DAILY
Status: DISCONTINUED | OUTPATIENT
Start: 2017-03-15 | End: 2017-03-16 | Stop reason: HOSPADM

## 2017-03-15 RX ORDER — LIDOCAINE HYDROCHLORIDE 10 MG/ML
1 INJECTION, SOLUTION EPIDURAL; INFILTRATION; INTRACAUDAL; PERINEURAL ONCE
Status: COMPLETED | OUTPATIENT
Start: 2017-03-15 | End: 2017-03-15

## 2017-03-15 RX ORDER — CEFAZOLIN SODIUM 2 G/100ML
2 INJECTION, SOLUTION INTRAVENOUS ONCE
Status: COMPLETED | OUTPATIENT
Start: 2017-03-15 | End: 2017-03-15

## 2017-03-15 RX ORDER — LIDOCAINE HYDROCHLORIDE 10 MG/ML
INJECTION, SOLUTION EPIDURAL; INFILTRATION; INTRACAUDAL; PERINEURAL AS NEEDED
Status: SHIPPED | OUTPATIENT
Start: 2017-03-15

## 2017-03-15 RX ORDER — ALPRAZOLAM 0.5 MG/1
0.5 TABLET ORAL 3 TIMES DAILY PRN
Status: DISCONTINUED | OUTPATIENT
Start: 2017-03-15 | End: 2017-03-16 | Stop reason: HOSPADM

## 2017-03-15 RX ORDER — FAMOTIDINE 20 MG/1
20 TABLET, FILM COATED ORAL ONCE
Status: COMPLETED | OUTPATIENT
Start: 2017-03-15 | End: 2017-03-15

## 2017-03-15 RX ORDER — SODIUM CHLORIDE 0.9 % (FLUSH) 0.9 %
1-10 SYRINGE (ML) INJECTION AS NEEDED
Status: DISCONTINUED | OUTPATIENT
Start: 2017-03-15 | End: 2017-03-16 | Stop reason: HOSPADM

## 2017-03-15 RX ORDER — NORTRIPTYLINE HYDROCHLORIDE 10 MG/1
10 CAPSULE ORAL NIGHTLY PRN
Status: DISCONTINUED | OUTPATIENT
Start: 2017-03-15 | End: 2017-03-16 | Stop reason: HOSPADM

## 2017-03-15 RX ORDER — NALOXONE HCL 0.4 MG/ML
0.4 VIAL (ML) INJECTION
Status: DISCONTINUED | OUTPATIENT
Start: 2017-03-15 | End: 2017-03-16 | Stop reason: HOSPADM

## 2017-03-15 RX ORDER — HYDROCODONE BITARTRATE AND ACETAMINOPHEN 5; 325 MG/1; MG/1
1 TABLET ORAL ONCE AS NEEDED
Status: DISCONTINUED | OUTPATIENT
Start: 2017-03-15 | End: 2017-03-15 | Stop reason: HOSPADM

## 2017-03-15 RX ORDER — SODIUM CHLORIDE 0.9 % (FLUSH) 0.9 %
1-10 SYRINGE (ML) INJECTION AS NEEDED
Status: DISCONTINUED | OUTPATIENT
Start: 2017-03-15 | End: 2017-03-15 | Stop reason: HOSPADM

## 2017-03-15 RX ADMIN — POTASSIUM CHLORIDE AND SODIUM CHLORIDE 75 ML/HR: 450; 150 INJECTION, SOLUTION INTRAVENOUS at 14:17

## 2017-03-15 RX ADMIN — NEOSTIGMINE METHYLSULFATE 3 MG: 1 INJECTION, SOLUTION INTRAVENOUS at 10:01

## 2017-03-15 RX ADMIN — CEFAZOLIN SODIUM 2 G: 2 INJECTION, SOLUTION INTRAVENOUS at 16:16

## 2017-03-15 RX ADMIN — TEMAZEPAM 30 MG: 15 CAPSULE ORAL at 21:43

## 2017-03-15 RX ADMIN — GLYCOPYRROLATE 0.2 MG: 0.2 INJECTION INTRAMUSCULAR; INTRAVENOUS at 08:32

## 2017-03-15 RX ADMIN — DEXAMETHASONE SODIUM PHOSPHATE 8 MG: 10 INJECTION INTRAMUSCULAR; INTRAVENOUS at 08:28

## 2017-03-15 RX ADMIN — SODIUM CHLORIDE, POTASSIUM CHLORIDE, SODIUM LACTATE AND CALCIUM CHLORIDE 100 ML/HR: 600; 310; 30; 20 INJECTION, SOLUTION INTRAVENOUS at 07:18

## 2017-03-15 RX ADMIN — GABAPENTIN 100 MG: 100 CAPSULE ORAL at 18:18

## 2017-03-15 RX ADMIN — LIDOCAINE HYDROCHLORIDE 0.2 ML: 10 INJECTION, SOLUTION EPIDURAL; INFILTRATION; INTRACAUDAL; PERINEURAL at 07:19

## 2017-03-15 RX ADMIN — FAMOTIDINE 20 MG: 20 TABLET ORAL at 18:18

## 2017-03-15 RX ADMIN — FAMOTIDINE 20 MG: 20 TABLET ORAL at 07:19

## 2017-03-15 RX ADMIN — METHOCARBAMOL 750 MG: 750 TABLET ORAL at 16:16

## 2017-03-15 RX ADMIN — GABAPENTIN 100 MG: 100 CAPSULE ORAL at 20:33

## 2017-03-15 RX ADMIN — CEFAZOLIN SODIUM 2 G: 2 INJECTION, SOLUTION INTRAVENOUS at 08:08

## 2017-03-15 RX ADMIN — FENTANYL CITRATE 25 MCG: 50 INJECTION, SOLUTION INTRAMUSCULAR; INTRAVENOUS at 10:25

## 2017-03-15 RX ADMIN — FENTANYL CITRATE 75 MCG: 50 INJECTION, SOLUTION INTRAMUSCULAR; INTRAVENOUS at 08:13

## 2017-03-15 RX ADMIN — HYDROMORPHONE HYDROCHLORIDE 0.5 MG: 1 INJECTION, SOLUTION INTRAMUSCULAR; INTRAVENOUS; SUBCUTANEOUS at 10:54

## 2017-03-15 RX ADMIN — MIDAZOLAM HYDROCHLORIDE 2 MG: 1 INJECTION INTRAMUSCULAR; INTRAVENOUS at 08:07

## 2017-03-15 RX ADMIN — ONDANSETRON 4 MG: 2 INJECTION INTRAMUSCULAR; INTRAVENOUS at 09:57

## 2017-03-15 RX ADMIN — OXYCODONE HYDROCHLORIDE AND ACETAMINOPHEN 1 TABLET: 10; 325 TABLET ORAL at 16:17

## 2017-03-15 RX ADMIN — OXYCODONE HYDROCHLORIDE AND ACETAMINOPHEN 1 TABLET: 10; 325 TABLET ORAL at 21:43

## 2017-03-15 RX ADMIN — LIDOCAINE HYDROCHLORIDE 50 MG: 10 INJECTION, SOLUTION EPIDURAL; INFILTRATION; INTRACAUDAL; PERINEURAL at 08:13

## 2017-03-15 RX ADMIN — DEXAMETHASONE 4 MG: 4 TABLET ORAL at 18:18

## 2017-03-15 RX ADMIN — FENTANYL CITRATE 50 MCG: 50 INJECTION, SOLUTION INTRAMUSCULAR; INTRAVENOUS at 10:45

## 2017-03-15 RX ADMIN — METHOCARBAMOL 750 MG: 750 TABLET ORAL at 20:32

## 2017-03-15 RX ADMIN — SODIUM CHLORIDE, POTASSIUM CHLORIDE, SODIUM LACTATE AND CALCIUM CHLORIDE: 600; 310; 30; 20 INJECTION, SOLUTION INTRAVENOUS at 08:08

## 2017-03-15 RX ADMIN — GLYCOPYRROLATE 0.4 MG: 0.2 INJECTION INTRAMUSCULAR; INTRAVENOUS at 10:01

## 2017-03-15 RX ADMIN — DOCUSATE SODIUM 100 MG: 100 CAPSULE, LIQUID FILLED ORAL at 18:18

## 2017-03-15 RX ADMIN — FENTANYL CITRATE 50 MCG: 50 INJECTION, SOLUTION INTRAMUSCULAR; INTRAVENOUS at 10:40

## 2017-03-15 RX ADMIN — ATRACURIUM BESYLATE 50 MG: 10 INJECTION, SOLUTION INTRAVENOUS at 08:13

## 2017-03-15 RX ADMIN — Medication 200 MG: at 08:13

## 2017-03-15 RX ADMIN — HYDROMORPHONE HYDROCHLORIDE 0.5 MG: 1 INJECTION, SOLUTION INTRAMUSCULAR; INTRAVENOUS; SUBCUTANEOUS at 11:00

## 2017-03-15 NOTE — PLAN OF CARE
Problem: Perioperative Period (Adult)  Goal: Signs and Symptoms of Listed Potential Problems Will be Absent or Manageable (Perioperative Period)  Outcome: Ongoing (interventions implemented as appropriate)    03/15/17 0729   Perioperative Period   Problems Assessed (Perioperative Period) all   Problems Present (Perioperative Period) pain

## 2017-03-15 NOTE — OP NOTE
Preoperative diagnosis: Recurrent herniated intervertebral disc L4-L5; left    Postoperative diagnosis: Same    Surgeon: Gilson Sun  Assistant: Zoë Hanson    Indication: Is is a 66-year-old male who had a discectomy performed several years ago from the L4-L5 interspace on the left side.  He did well however had recurrent symptomatology which failed respond to conservativism and he was admitted at this time for surgical intervention.    .  The risks and benefits of the procedure were discussed with the patient preoperatively.  Consent forms were given and signatures obtained.  All questions were answered satisfactorily and the patient wished to proceed with with the procedure.    Operative report:     Subsequent to the induction of general anesthesia the patient was placed in a prone and flexed position.  Prep and drape was performed in the usual fashion.  Through a linear incision, the paraspinal musculature was dissected exposing the spinous process and lamina of L4.  The appropriate level was determined radiographically.    A self-retaining retractor was inserted.  A laminotomy was performed using the Kerrison punch and Midas Gera high-speed drill.  Medial facetectomy and foraminotomy was carried out..  The previous laminectomy was extended cephalad and laterally in order to gain closure.  There was an unusual amount of adhesions scar tissue to the level of the lamina.  We were able to get above the scar tissue in work our way toward the facet complex of L4-L5.    The surgical findings included : Several large fragments of disc material were removed from beneath the dura and the nerve root.  There is significant scar tissue making dissection somewhat tedious and difficult.  No less with removing the large fragments a more radical discectomy was performed.  Farley ball placed through the foramen without difficulty.    The nerve root and dura were widely decompressed.  A blunt probe passed freely  through the neural foramen.  No additional compressive elements were identified.  Hemostasis was obtained with the use of Gelfoam and FloSeal.  The wound was closed with multiple layers of Vicryl.  A sterile dressing was applied and the patient was taken to recovery in satisfactory condition.    Complications: None appreciated.

## 2017-03-15 NOTE — ANESTHESIA PREPROCEDURE EVALUATION
Anesthesia Evaluation     Patient summary reviewed and Nursing notes reviewed   no history of anesthetic complications:  NPO Status: > 8 hours   Airway   Mallampati: II  TM distance: >3 FB  Neck ROM: full  no difficulty expected  Dental - normal exam     Pulmonary - negative pulmonary ROS and normal exam    breath sounds clear to auscultation  Cardiovascular - negative cardio ROS and normal exam    Rhythm: regular  Rate: normal        Neuro/Psych- negative ROS  GI/Hepatic/Renal/Endo - negative ROS     Musculoskeletal     (+) back pain,   Abdominal    Substance History - negative use     OB/GYN negative ob/gyn ROS         Other   (+) arthritis                                 Anesthesia Plan    ASA 1     general     intravenous induction   Anesthetic plan and risks discussed with patient.    Plan discussed with CRNA.

## 2017-03-15 NOTE — PLAN OF CARE
Problem: Patient Care Overview (Adult)  Goal: Plan of Care Review  Outcome: Ongoing (interventions implemented as appropriate)    03/15/17 1707   Coping/Psychosocial Response Interventions   Plan Of Care Reviewed With patient;spouse   Patient Care Overview   Progress progress toward functional goals as expected   Outcome Evaluation   Outcome Summary/Follow up Plan Spoke with patient about post op pain mgt, positioning and deep breathing.          Problem: Perioperative Period (Adult)  Goal: Signs and Symptoms of Listed Potential Problems Will be Absent or Manageable (Perioperative Period)  Outcome: Ongoing (interventions implemented as appropriate)    03/15/17 1707   Perioperative Period   Problems Present (Perioperative Period) pain;physiologic stress response

## 2017-03-15 NOTE — PLAN OF CARE
Problem: Patient Care Overview (Adult)  Goal: Plan of Care Review  Outcome: Ongoing (interventions implemented as appropriate)    03/15/17 0514   Coping/Psychosocial Response Interventions   Plan Of Care Reviewed With patient   Patient Care Overview   Progress improving

## 2017-03-15 NOTE — H&P (VIEW-ONLY)
Eugene Ames  1950  [id number]                       CURRENT WORKING DIAGNOSIS:  [Recurrent herniated intervertebral disc L4-L5; left ]         MEDICAL HISTORY SINCE LAST ENCOUNTER:  [This patient has been through physical therapy pain management and medications to no avail.  He continues to have severe pain in his back rating to the left leg secondary to a recurrent herniated intervertebral disc demonstrated by lumbar MRI in November.  He continues to have an L5 radiculopathy associated with the weakness of the extensor hallucis longus. ]           Past Medical History   Diagnosis Date   • Allergic rhinitis    • ED (erectile dysfunction)    • Extremity pain    • Joint pain    • Low back pain    • Lumbosacral disc disease               Past Surgical History   Procedure Laterality Date   • Lumbar discectomy  08/07/2015     Left L4/5 (Sun)              Family History   Problem Relation Age of Onset   • Cancer Mother    • Heart attack Father               Social History     Social History   • Marital status:      Spouse name: N/A   • Number of children: N/A   • Years of education: N/A     Occupational History   • Not on file.     Social History Main Topics   • Smoking status: Never Smoker   • Smokeless tobacco: Not on file   • Alcohol use No   • Drug use: No   • Sexual activity: Not on file     Other Topics Concern   • Not on file     Social History Narrative            No Known Allergies           Current Outpatient Prescriptions:   •  diclofenac (VOLTAREN) 75 MG EC tablet, Take 1 tablet by mouth 2 (Two) Times a Day., Disp: 30 tablet, Rfl: 1  •  gabapentin (NEURONTIN) 100 MG capsule, Start 1 tab QHS for 3 days, then, BID for 3 days, then, TID for 3 days, then, cont. QID, Disp: 120 capsule, Rfl: 6  •  methocarbamol (ROBAXIN) 750 MG tablet, Take 1 tablet by mouth 3 (Three) Times a Day., Disp: 30 tablet, Rfl: 0  •  nortriptyline (PAMELOR) 10 MG capsule, Take 1 capsule by mouth At Night As Needed for sleep.  1-2 TABLETS AT BEDTIME, Disp: 60 capsule, Rfl: 6  •  oxyCODONE-acetaminophen (PERCOCET) 7.5-325 MG per tablet, Take 1 tablet by mouth Every 6 (Six) Hours As Needed for moderate pain (4-6)., Disp: 60 tablet, Rfl: 0         Review of Systems   Constitutional: Negative for activity change, appetite change, chills, diaphoresis, fatigue, fever and unexpected weight change.   HENT: Negative for congestion, dental problem, drooling, ear discharge, ear pain, facial swelling, hearing loss, mouth sores, nosebleeds, postnasal drip, rhinorrhea, sinus pressure, sneezing, sore throat, tinnitus, trouble swallowing and voice change.    Eyes: Negative for photophobia, pain, discharge, redness, itching and visual disturbance.   Respiratory: Negative for apnea, cough, choking, chest tightness, shortness of breath, wheezing and stridor.    Cardiovascular: Negative for chest pain, palpitations and leg swelling.   Gastrointestinal: Negative for abdominal distention, abdominal pain, anal bleeding, blood in stool, constipation, diarrhea, nausea, rectal pain and vomiting.   Endocrine: Negative for cold intolerance, heat intolerance, polydipsia, polyphagia and polyuria.   Genitourinary: Negative for decreased urine volume, difficulty urinating, dysuria, enuresis, flank pain, frequency, genital sores, hematuria and urgency.   Musculoskeletal: Positive for arthralgias, back pain, gait problem and myalgias. Negative for joint swelling, neck pain and neck stiffness.   Skin: Negative for color change, pallor, rash and wound.   Allergic/Immunologic: Negative for environmental allergies, food allergies and immunocompromised state.   Neurological: Negative for dizziness, tremors, seizures, syncope, facial asymmetry, speech difficulty, weakness, light-headedness, numbness and headaches.   Hematological: Negative for adenopathy. Does not bruise/bleed easily.   Psychiatric/Behavioral: Negative for agitation, behavioral problems, confusion, decreased  "concentration, dysphoric mood, hallucinations, self-injury, sleep disturbance and suicidal ideas. The patient is not nervous/anxious and is not hyperactive.    All other systems reviewed and are negative.              Vitals:    03/13/17 1045   BP: 148/82   BP Location: Left arm   Patient Position: Sitting   Pulse: 74   Resp: 18   Temp: 97.7 °F (36.5 °C)   TempSrc: Temporal Artery    SpO2: 98%   Weight: 172 lb 12.8 oz (78.4 kg)   Height: 68\" (172.7 cm)               EXAMINATION: He has a positive straight leg raising, weakness of the extensor hallucis longus and anterior tibialis on the left side.  The Achilles reflex is intact            MEDICAL DECISION MAKING: Having failed to respond to conservativism using all modalities surgery has been recommended           ASSESSMENT/DISPOSITION: We will schedule him for exploration and excision of recurrent herniated intravertebral disc L4-L5, left as soon as possible.              I APPRECIATE THE OPPORTUNITY OF THIS REFERRAL. PLEASE CALL IF ANY  QUESTIONS [phone]    Scribed for Gilson Sun MD by Sacha Yung CMA. 3/13/2017  11:01 AM             "

## 2017-03-15 NOTE — BRIEF OP NOTE
LUMBAR DISCECTOMY POSTERIOR 1-2 LEVELS  Procedure Note    Eugene Ames  3/15/2017    Pre-op Diagnosis:   HNP (herniated nucleus pulposus), lumbar [M51.26]    Post-op Diagnosis:     Post-Op Diagnosis Codes:     * HNP (herniated nucleus pulposus), lumbar [M51.26]      Procedure(s):  LEFT  RE-DO LUMBAR DISECTOMY  L4-5    Surgeon(s):  Gilson Sun MD    Anesthesia: General    Staff:   Circulator: Renae Byrne RN  Scrub Person: Sarai Dominique  Assistant: Matilda Hanson PA-C    Estimated Blood Loss: 105 mL  Urine Voided: * No values recorded between 3/15/2017  8:08 AM and 3/15/2017 10:05 AM *    Specimens:                * No specimens in log *      Drains:           Findings: Several fragments associated with significant adhesions and postoperative scar formation    Complications: (  Gilson Sun MD     Date: 3/15/2017  Time: 10:05 AM

## 2017-03-15 NOTE — INTERVAL H&P NOTE
Pre-Op H&P (See Recent Office Note Attached)    Chief complaint: Low back pain into LLE with numbness/tingling    Review of Systems:  General ROS:  no fever, chills, rashes, No change since last office visit  Cardiovascular ROS: no chest pain or dyspnea on exertion  Respiratory ROS: no cough, shortness of breath, or wheezing    Immunization History:   Influenza:  Yes 2016  Pneumococcal:  Yes < 5 years  Tetanus:  unknown    Vital Signs:  See RN charting for vitals    Physical Exam:    CV:  S1S2 regular rate and rhythm, no murmur               Resp:  Clear to auscultation; respirations regular, even and unlabored    Results Review:    I reviewed the patient's new clinical results.    Merline Santana, APRN  3/15/2017 7:20 AM

## 2017-03-15 NOTE — PLAN OF CARE
Problem: Patient Care Overview (Adult)  Goal: Adult Individualization and Mutuality  Outcome: Ongoing (interventions implemented as appropriate)    03/15/17 0555   Individualization   Patient Specific Preferences NONE   Patient Specific Goals NONE   Patient Specific Interventions NONE   Mutuality/Individual Preferences   What Anxieties, Fears or Concerns Do You Have About Your Health or Care? NONE   What Questions Do You Have About Your Health or Care? NONE   What Information Would Help Us Give You More Personalized Care? NONE

## 2017-03-16 VITALS
HEIGHT: 68 IN | TEMPERATURE: 97.8 F | SYSTOLIC BLOOD PRESSURE: 110 MMHG | HEART RATE: 83 BPM | WEIGHT: 169 LBS | DIASTOLIC BLOOD PRESSURE: 64 MMHG | BODY MASS INDEX: 25.61 KG/M2 | OXYGEN SATURATION: 97 % | RESPIRATION RATE: 16 BRPM

## 2017-03-16 PROCEDURE — 97162 PT EVAL MOD COMPLEX 30 MIN: CPT

## 2017-03-16 PROCEDURE — 25010000002 DEXAMETHASONE PER 1 MG: Performed by: NEUROLOGICAL SURGERY

## 2017-03-16 PROCEDURE — 97530 THERAPEUTIC ACTIVITIES: CPT | Performed by: OCCUPATIONAL THERAPIST

## 2017-03-16 PROCEDURE — G8978 MOBILITY CURRENT STATUS: HCPCS

## 2017-03-16 PROCEDURE — G8989 SELF CARE D/C STATUS: HCPCS | Performed by: OCCUPATIONAL THERAPIST

## 2017-03-16 PROCEDURE — 25010000003 CEFAZOLIN IN DEXTROSE 2-4 GM/100ML-% SOLUTION: Performed by: NEUROLOGICAL SURGERY

## 2017-03-16 PROCEDURE — G0378 HOSPITAL OBSERVATION PER HR: HCPCS

## 2017-03-16 PROCEDURE — G8987 SELF CARE CURRENT STATUS: HCPCS | Performed by: OCCUPATIONAL THERAPIST

## 2017-03-16 PROCEDURE — G8979 MOBILITY GOAL STATUS: HCPCS

## 2017-03-16 PROCEDURE — 63710000001 DEXAMETHASONE PER 0.25 MG: Performed by: NEUROLOGICAL SURGERY

## 2017-03-16 PROCEDURE — G8980 MOBILITY D/C STATUS: HCPCS

## 2017-03-16 PROCEDURE — 25810000003 POTASSIUM CHLORIDE PER 2 MEQ: Performed by: NEUROLOGICAL SURGERY

## 2017-03-16 PROCEDURE — G8988 SELF CARE GOAL STATUS: HCPCS | Performed by: OCCUPATIONAL THERAPIST

## 2017-03-16 PROCEDURE — 97165 OT EVAL LOW COMPLEX 30 MIN: CPT | Performed by: OCCUPATIONAL THERAPIST

## 2017-03-16 RX ORDER — OXYCODONE AND ACETAMINOPHEN 10; 325 MG/1; MG/1
1 TABLET ORAL EVERY 6 HOURS PRN
Qty: 60 TABLET | Refills: 0 | Status: SHIPPED | OUTPATIENT
Start: 2017-03-16 | End: 2017-04-25

## 2017-03-16 RX ADMIN — POTASSIUM CHLORIDE AND SODIUM CHLORIDE 75 ML/HR: 450; 150 INJECTION, SOLUTION INTRAVENOUS at 00:38

## 2017-03-16 RX ADMIN — METHOCARBAMOL 750 MG: 750 TABLET ORAL at 08:03

## 2017-03-16 RX ADMIN — DEXAMETHASONE SODIUM PHOSPHATE 4 MG: 4 INJECTION, SOLUTION INTRA-ARTICULAR; INTRALESIONAL; INTRAMUSCULAR; INTRAVENOUS; SOFT TISSUE at 00:37

## 2017-03-16 RX ADMIN — CEFAZOLIN SODIUM 2 G: 2 INJECTION, SOLUTION INTRAVENOUS at 00:37

## 2017-03-16 RX ADMIN — OXYCODONE HYDROCHLORIDE AND ACETAMINOPHEN 1 TABLET: 10; 325 TABLET ORAL at 04:05

## 2017-03-16 RX ADMIN — FAMOTIDINE 20 MG: 20 TABLET ORAL at 08:03

## 2017-03-16 RX ADMIN — DEXAMETHASONE 4 MG: 4 TABLET ORAL at 04:05

## 2017-03-16 RX ADMIN — GABAPENTIN 100 MG: 100 CAPSULE ORAL at 08:03

## 2017-03-16 RX ADMIN — DOCUSATE SODIUM 100 MG: 100 CAPSULE, LIQUID FILLED ORAL at 08:03

## 2017-03-16 NOTE — THERAPY DISCHARGE NOTE
Acute Care - Occupational Therapy Initial Eval/Discharge  [facility]     Patient Name: Eugene Ames  : 1950  MRN: [id number]  Today's Date: 3/16/2017  Onset of Illness/Injury or Date of Surgery Date: 03/15/17  Date of Referral to OT: 03/15/17  Referring Physician: Dr. Sun      Admit Date: 3/15/2017       ICD-10-CM ICD-9-CM   1. Impaired mobility and ADLs Z74.09 799.89     Patient Active Problem List   Diagnosis   • HNP (herniated nucleus pulposus), lumbar   • Lumbar herniated disc     Past Medical History   Diagnosis Date   • Allergic rhinitis    • ED (erectile dysfunction)    • Extremity pain    • Joint pain    • Low back pain    • Lumbosacral disc disease    • Weakness of left lower extremity    • Wears glasses      Past Surgical History   Procedure Laterality Date   • Lumbar discectomy  2015     Left L4/5 ([place])   • Colonoscopy  2012     X2          OT ASSESSMENT FLOWSHEET (last 72 hours)      OT Evaluation       17 0927 17 0811 17 0330 03/15/17 2143 03/15/17 1800    Rehab Evaluation    Document Type  evaluation;therapy note (daily note);discharge summary  -AR       Subjective Information  no complaints;agree to therapy  -AR       Patient Effort, Rehab Treatment  excellent  -AR       Symptoms Noted During/After Treatment  none  -AR       General Information    Patient Profile Review  yes  -AR       Onset of Illness/Injury or Date of Surgery Date  03/15/17  -AR       Referring Physician  Dr. Sun  -AR       General Observations  Pt supine, spouse at bedside  -AR       Pertinent History Of Current Problem  Pt is a 66 yom admitted for surgical management of progressive back and LLE pain and dysfunction that has failed to improve with conservative measures. Pt is POD#1 left re-do lumbar discectomy L4-L5. PTA, pt was sleeping in floor and lying in floor during majority of day for pain reilef. Mobility was limited due to pain and LLE tingling. he had pain with LB ADLS and was  "unable to ascend steps to access his office in the upstairs of his home.   -AR       Precautions/Limitations  spinal precautions;other (see comments)   NO SITTING X 3 WEEKS  -AR       Prior Level of Function  min assist:;all household mobility;community mobility;gait;transfer;ADL's;shopping;driving;dependent:;using stairs  -AR       Equipment Currently Used at Home  raised toilet  -AR none  -JR  none  -KK    Plans/Goals Discussed With  patient and family;agreed upon  -AR       Risks Reviewed  patient and family:;LOB;nausea/vomiting;dizziness;increased discomfort;change in vital signs;increased drainage;lines disloged  -AR       Benefits Reviewed  patient and family:;decrease risk of DVT;increase knowledge;improve function;increase independence  -AR       Barriers to Rehab  none identified  -AR       Living Environment    Lives With  spouse  -AR       Living Arrangements  house  -AR       Home Accessibility  no concerns   walk-in shower, no steps to enter, avoidable steps  -AR       Stair Railings at Home  inside, present at both sides  -AR       Type of Financial/Environmental Concern  none  -AR       Transportation Available  none  -AR       Living Environment Comment  Pt lives with spouse who can provide 24/hr assist if needed.   -AR       Clinical Impression    Date of Referral to OT  03/15/17  -AR       OT Diagnosis  decreased independence with ADLS  -AR       Patient/Family Goals Statement  return home, walk upright without \"leaning\"  -AR       Criteria for Skilled Therapeutic Interventions Met  yes;treatment indicated  -AR       Rehab Potential  good, to achieve stated therapy goals  -AR       Therapy Frequency  daily   per priority policy  -AR       Anticipated Discharge Disposition home with assist  -AR home with assist  -AR       Functional Level Prior    Transferring    0-->independent  -JR     Toileting    0-->independent  -JR     Bathing    0-->independent  -JR     Dressing    0-->independent  -JR     " Eating    0-->independent  -JR     Communication    0-->understands/communicates without difficulty  -JR     Swallowing    0-->swallows foods/liquids without difficulty  -JR     Vital Signs    Intra Systolic BP Rehab  137  -AR       Intra Treatment Diastolic BP  64  -AR       Pre Patient Position  Supine  -AR       Intra Patient Position  Standing  -AR       Post Patient Position  Standing  -AR       Pain Assessment    Pain Assessment  No/denies pain  -AR       Vision Assessment/Intervention    Visual Impairment  WNL  -AR       Cognitive Assessment/Intervention    Current Cognitive/Communication Assessment  functional  -AR       Orientation Status  oriented x 4  -AR       Follows Commands/Answers Questions  100% of the time;able to follow multi-step instructions  -AR       Personal Safety  WNL/WFL  -AR       Personal Safety Interventions  gait belt;nonskid shoes/slippers when out of bed;supervised activity  -AR       ROM (Range of Motion)    General ROM  no range of motion deficits identified   BUE  -AR       MMT (Manual Muscle Testing)    General MMT Assessment  other (see comments)   formal MMT deferred, WNL up on observation  -AR       Bed Mobility, Assessment/Treatment    Bed Mob, Supine to Sit, [place]  independent  -AR       Bed Mobility, Comment  pt executed logroll without issue  -AR       Transfer Assessment/Treatment    Transfers, Sit-Stand [place]  independent  -AR       Transfers, Stand-Sit [place]  independent  -AR       Functional Mobility    Functional Mobility- Ind. Level  independent  -AR       Lower Body Bathing Assessment/Training    LB Bathing Assess/Train, Comment  Educated pt on back precautions and issued long-handled sponge to assist pt in maintaining  -AR       Lower Body Dressing Assessment/Training    LB Dressing Assess/Train, Comment  Educated pt on safe positioning to complete LB ADLS and issued reacher and sockaide to assist, educated pt on use of tools   -AR        Toileting Assessment/Training    Toileting Assess/Train, Comment  Educated pt on importance of maintaining spinal precautions during post-toilet hygiene and pt declind toilet tongs for home  -AR       Motor Skills/Interventions    Additional Documentation  --   BUE GMC&FMC intact evidenced by finger-to-nose&opposition  -AR       Sensory Assessment/Intervention    Sensory Impairment  --   BUE sensation intact light touch   -AR       General Therapy Interventions    Adaptive Equipment Training  issued AE, educated pt on use  -AR       ADL Retraining  Educated pt on spinal precautions and maintaining during ADL routine  -AR       Transfer Training  reviewed safe car transafer technique and safe positioning  -AR       Positioning and Restraints    Pre-Treatment Position  in bed  -AR       Post Treatment Position  other  -AR       Other Position  --   standing in room with PT  -AR         03/15/17 0726 03/14/17 1229             General Information    Equipment Currently Used at Home  none  -       Living Environment    Lives With spouse  - spouse  -       Living Arrangements house  - house  -       Home Accessibility no concerns  -KK bed and bath on same level  -       Stair Railings at Home inside, present at both sides  -KK none  -       Type of Financial/Environmental Concern none  -KK none  -       Transportation Available none  - car  -LH       Functional Level Prior    Ambulation 0-->independent  -MB        Transferring 0-->independent  -MB        Toileting 0-->independent  -MB        Bathing 0-->independent  -MB        Dressing 0-->independent  -MB        Eating 0-->independent  -MB        Communication 0-->understands/communicates without difficulty  -MB        Swallowing 0-->swallows foods/liquids without difficulty  -MB        Prior Functional Level Comment na  -KK          User Key  (r) = Recorded By, (t) = Taken By, (c) = Cosigned By    Initials Name Effective Dates    AR Sandra Saenz,  OT 06/22/15 -     MB Vivian Terrazas, MEGHA 06/16/16 -     LH Barbara Adamson, MEGHA 06/16/16 -     KK Tom Mabry-MEGHA [place] 06/16/16 -     JR Rita Santiago RN 09/18/16 -           Occupational Therapy Education     Title: PT OT SLP Therapies (Active)     Topic: Occupational Therapy (Active)     Point: ADL training (Done)    Description: Instruct learner(s) on proper safety adaptation and remediation techniques during self care or transfers.   Instruct in proper use of assistive devices.    Learning Progress Summary    Learner Readiness Method Response Comment Documented by Status   Patient NICOLE [place],ADENIKE,H VU,DU Reviewed spinal precautions, ADL retraining to incorporate precautions, home safety, positining, restrictions, bed mobility, AR 03/16/17 0924 Done   Significant Other NICOLE Alamo D,H VU,DU Reviewed spinal precautions, ADL retraining to incorporate precautions, home safety, positining, restrictions, bed mobility, AR 03/16/17 0924 Done               Point: Precautions (Done)    Description: Instruct learner(s) on prescribed precautions during self-care and functional transfers.    Learning Progress Summary    Learner Readiness Method Response Comment Documented by Status   Patient Jesus SAN,NICOLE,ADENIKE,H VU,DU Reviewed spinal precautions, ADL retraining to incorporate precautions, home safety, positining, restrictions, bed mobility, AR 03/16/17 0924 Done   Significant Other NICOLE Alamo D,H VU,DU Reviewed spinal precautions, ADL retraining to incorporate precautions, home safety, positining, restrictions, bed mobility, AR 03/16/17 0924 Done               Point: Body mechanics (Done)    Description: Instruct learner(s) on proper positioning and spine alignment during self-care, functional mobility activities and/or exercises.    Learning Progress Summary    Learner Readiness Method Response Comment Documented by Status   Patient NICOLE [place],ADENIKE,H VU,DU Reviewed spinal precautions, ADL retraining to incorporate precautions,  home safety, positining, restrictions, bed mobility, AR 03/16/17 0924 Done   Significant Other Jesus E,TB,D,H SRIDEVI,DAMIR Reviewed spinal precautions, ADL retraining to incorporate precautions, home safety, positining, restrictions, bed mobility, AR 03/16/17 0924 Done                      User Key     Initials Effective Dates Name Provider Type Discipline    AR 06/22/15 -  Sandra Saenz, OT Occupational Therapist OT                OT Recommendation and Plan  Anticipated Discharge Disposition: home with assist  Therapy Frequency: daily (per priority policy)  Plan of Care Review  Plan Of Care Reviewed With: patient, spouse  Outcome Summary/Follow up Plan: OT evaluation complete and pt/family education complete. AE issued. Pt has no DME needs at this time. Recommend DC home with assist from spouse.            OT Goals       03/16/17 0925          Bed Mobility OT LTG    Bed Mobility OT LTG, Date Established 03/16/17  -AR      Bed Mobility OT LTG, Time to Achieve by discharge  -AR      Bed Mobility OT LTG, Activity Type supine to sit/sit to supine   via logroll technique  -AR      Bed Mobility OT LTG, [place] Level verbal cues required;supervision required  -AR      Bed Mobility OT LTG, Outcome goal met  -AR      Transfer Training OT LTG    Transfer Training OT LTG, Date Established 03/16/17  -AR      Transfer Training OT LTG, Time to Achieve by discharge  -AR      Transfer Training OT LTG, Activity Type sit to stand/stand to sit  -AR      Transfer Training OT LTG, [place] Level verbal cues required;supervision required  -AR      Patient Education OT LTG    Patient Education OT LTG, Date Established 03/16/17  -AR      Patient Education OT LTG, Time to Achieve by discharge  -AR      Patient Education OT LTG, Education Type precautions per surgeon;positioning;home safety;adaptive equipment mgmt  -AR      Patient Education OT LTG, Education Understanding demonstrates adequately;verbalizes understanding  -AR         User Key  (r) = Recorded By, (t) = Taken By, (c) = Cosigned By    Initials Name Provider Type    AR Sandra Saenz OT Occupational Therapist                Outcome Measures       03/16/17 0811          How much help from another is currently needed...    Putting on and taking off regular lower body clothing? 3  -AR      Bathing (including washing, rinsing, and drying) 4  -AR      Toileting (which includes using toilet bed pan or urinal) 4  -AR      Putting on and taking off regular upper body clothing 4  -AR      Taking care of personal grooming (such as brushing teeth) 4  -AR      Eating meals 4  -AR      Score 23  -AR      Functional Assessment    Outcome Measure Options AM-PAC 6 Clicks Daily Activity (OT)  -AR        User Key  (r) = Recorded By, (t) = Taken By, (c) = Cosigned By    Initials Name Provider Type    AR Sandra Saenz OT Occupational Therapist          Time Calculation:         Time Calculation- OT       03/16/17 0928          Time Calculation- OT    OT Start Time 0811  -AR      Total Timed Code Minutes- OT 24 minute(s)  -AR      OT Received On 03/16/17  -AR      OT Goal Re-Cert Due Date 03/16/17  -AR        User Key  (r) = Recorded By, (t) = Taken By, (c) = Cosigned By    Initials Name Provider Type    ALEXIS Saenz OT Occupational Therapist          Therapy Charges for Today     Code Description Service Date Service Provider Modifiers Qty    04087336329 HC OT SELFCARE CURRENT 3/16/2017 Sandra Saenz OT GO, CI 1    60634322355 HC OT SELFCARE PROJECTED 3/16/2017 Sandra Saenz OT GO, CI 1    57505409793 HC OT SELFCARE DISCHARGE 3/16/2017 Sandra Saenz OT GO, CI 1    [id number]  OT THERAPEUTIC ACT EA 15 MIN 3/16/2017 Sandra Saenz OT GO 1    [id number]  OT EVAL LOW COMPLEXITY 2 3/16/2017 Sandra Saenz OT GO 1          OT G-codes  OT Professional Judgement Used?: Yes  OT Functional Scales Options: AM-PAC 6 Clicks Daily Activity (OT)  Functional  Assessment Tool Used: 6 clicks  Score: 23  Functional Limitation: Self care  Self Care Current Status (): At least 1 percent but less than 20 percent impaired, limited or restricted  Self Care Goal Status (): At least 1 percent but less than 20 percent impaired, limited or restricted  Self Care Discharge Status (): At least 1 percent but less than 20 percent impaired, limited or restricted    OT Discharge Summary  Anticipated Discharge Disposition: home with assist  Reason for Discharge: Discharge from facility  Outcomes Achieved: Able to achieve all goals within established timeline  Discharge Destination: Home with assist    Sandra Saenz OT  3/16/2017

## 2017-03-16 NOTE — THERAPY DISCHARGE NOTE
Acute Care - Physical Therapy Initial Eval/Discharge  [facility]     Patient Name: Eugene Ames  : 1950  MRN: [id number]  Today's Date: 3/16/2017   Onset of Illness/Injury or Date of Surgery Date: 03/15/17  Date of Referral to PT: 03/15/17  Referring Physician: Dr Sun      Admit Date: 3/15/2017    Visit Dx:    ICD-10-CM ICD-9-CM   1. Impaired mobility and ADLs Z74.09 799.89   2. Impaired functional mobility, balance, gait, and endurance Z74.09 V49.89     Patient Active Problem List   Diagnosis   • HNP (herniated nucleus pulposus), lumbar   • Lumbar herniated disc     Past Medical History   Diagnosis Date   • Allergic rhinitis    • ED (erectile dysfunction)    • Extremity pain    • Joint pain    • Low back pain    • Lumbosacral disc disease    • Weakness of left lower extremity    • Wears glasses      Past Surgical History   Procedure Laterality Date   • Lumbar discectomy  2015     Left L4/5 ([place])   • Colonoscopy  2012     X2   • Lumbar discectomy Left 3/15/2017     Procedure: LEFT  RE-DO LUMBAR DISECTOMY  L4-5;  Surgeon: Gilson Sun MD;  Location: [facility];  Service:           PT ASSESSMENT (last 72 hours)      PT Evaluation       17 0941 17 0825    Rehab Evaluation    Document Type  evaluation  -SC    Subjective Information  no complaints  -SC    Patient Effort, Rehab Treatment  excellent  -SC    Symptoms Noted During/After Treatment  none  -SC    General Information    Patient Profile Review  yes  -SC    Onset of Illness/Injury or Date of Surgery Date  03/15/17  -SC    Referring Physician  Dr Sun  -SC    General Observations  in bed  -SC    Pertinent History Of Current Problem  admitted with HNP s/p disectomy L45  -SC    Precautions/Limitations  spinal precautions  -SC    Prior Level of Function  independent:;ADL's;community mobility  -SC    Equipment Currently Used at Home raised toilet  -SK     Plans/Goals Discussed With  patient and family;agreed upon  -SC    Risks  Reviewed  patient and family:;increased discomfort  -SC    Benefits Reviewed  patient and family:;improve function;increase independence;increase balance  -SC    Barriers to Rehab  none identified  -SC    Living Environment    Lives With spouse  -SK spouse  -SC    Living Arrangements house   two level  -SK house  -SC    Home Accessibility  no concerns  -SC    Transportation Available car;family or friend will provide  -SK     Clinical Impression    Date of Referral to PT  03/15/17  -SC    PT Diagnosis  Impaired mobility  -SC    Patient/Family Goals Statement  go home  -SC    Criteria for Skilled Therapeutic Interventions Met  yes;treatment indicated  -SC    Impairments Found (describe specific impairments)  motor function;muscle performance  -SC    Rehab Potential  good, to achieve stated therapy goals  -SC    Pain Assessment    Pain Assessment  No/denies pain  -SC    Cognitive Assessment/Intervention    Current Cognitive/Communication Assessment  functional  -SC    Orientation Status  oriented x 4  -SC    Follows Commands/Answers Questions  100% of the time  -SC    Personal Safety  WNL/WFL  -SC    Personal Safety Interventions  gait belt  -SC    ROM (Range of Motion)    General ROM  no range of motion deficits identified  -SC    MMT (Manual Muscle Testing)    General MMT Assessment  no strength deficits identified   grossly 4+/5, loss of motor control noted on left leg   -SC    Muscle Tone Assessment    Muscle Tone Assessment  --   normal  -SC    Bed Mobility, Assessment/Treatment    Bed Mob, Supine to Sit, [place]  independent  -SC    Bed Mobility, Comment  good log roll  -SC    Transfer Assessment/Treatment    Transfers, Sit-Stand [place]  independent  -SC    Transfers, Stand-Sit [place]  independent  -SC    Gait Assessment/Treatment    Gait, [place] Level  stand by assist  -SC    Gait, Distance (Feet)  400  -SC    Gait, Gait Pattern Analysis  swing-through gait  -SC    Gait, Gait Deviations   other (see comments)   slight trunk flexion and rotation to right  -SC    Gait, Impairments  motor control impaired  -SC    Gait, Comment  cues to correct posture during gait- able to sustain this  -SC    Therapy Exercises    Bilateral Lower Extremities  AROM:;10 reps;supine;ankle pumps/circles;heel slides;hip abduction/adduction;standing;hip flexion;heel raises  -SC    Bilateral Upper Extremity  10 reps;AROM:;supine;shoulder extension/flexion  -SC    Sensory Assessment/Intervention    Sensory Impairment  --   normal  -SC    Positioning and Restraints    Pre-Treatment Position  in bed  -SC    Post Treatment Position  bed  -SC    In Bed  supine;call light within reach;with family/caregiver  -SC      03/16/17 0811 03/16/17 0330    Rehab Evaluation    Document Type evaluation;therapy note (daily note);discharge summary  -AR     Subjective Information no complaints;agree to therapy  -AR     Patient Effort, Rehab Treatment excellent  -AR     Symptoms Noted During/After Treatment none  -AR     General Information    Patient Profile Review yes  -AR     Onset of Illness/Injury or Date of Surgery Date 03/15/17  -AR     Referring Physician Dr. Sun  -AR     General Observations Pt supine, spouse at bedside  -AR     Pertinent History Of Current Problem Pt is a 66 yom admitted for surgical management of progressive back and LLE pain and dysfunction that has failed to improve with conservative measures. Pt is POD#1 left re-do lumbar discectomy L4-L5. PTA, pt was sleeping in floor and lying in floor during majority of day for pain reilef. Mobility was limited due to pain and LLE tingling. he had pain with LB ADLS and was unable to ascend steps to access his office in the upstairs of his home.   -AR     Precautions/Limitations spinal precautions;other (see comments)   NO SITTING X 3 WEEKS  -AR     Prior Level of Function min assist:;all household mobility;community mobility;gait;transfer;ADL's;shopping;driving;dependent:;using  stairs  -AR     Equipment Currently Used at Home raised toilet  -AR none  -JR    Plans/Goals Discussed With patient and family;agreed upon  -AR     Risks Reviewed patient and family:;LOB;nausea/vomiting;dizziness;increased discomfort;change in vital signs;increased drainage;lines disloged  -AR     Benefits Reviewed patient and family:;decrease risk of DVT;increase knowledge;improve function;increase independence  -AR     Barriers to Rehab none identified  -AR     Living Environment    Lives With spouse  -AR     Living Arrangements house  -AR     Home Accessibility no concerns   walk-in shower, no steps to enter, avoidable steps  -AR     Stair Railings at Home inside, present at both sides  -AR     Type of Financial/Environmental Concern none  -AR     Transportation Available none  -AR     Living Environment Comment Pt lives with spouse who can provide 24/hr assist if needed.   -AR     Vital Signs    Intra Systolic BP Rehab 137  -AR     Intra Treatment Diastolic BP 64  -AR     Pre Patient Position Supine  -AR     Intra Patient Position Standing  -AR     Post Patient Position Standing  -AR     Pain Assessment    Pain Assessment No/denies pain  -AR     Vision Assessment/Intervention    Visual Impairment WNL  -AR     Cognitive Assessment/Intervention    Current Cognitive/Communication Assessment functional  -AR     Orientation Status oriented x 4  -AR     Follows Commands/Answers Questions 100% of the time;able to follow multi-step instructions  -AR     Personal Safety WNL/WFL  -AR     Personal Safety Interventions gait belt;nonskid shoes/slippers when out of bed;supervised activity  -AR     ROM (Range of Motion)    General ROM no range of motion deficits identified   BUE  -AR     MMT (Manual Muscle Testing)    General MMT Assessment other (see comments)   formal MMT deferred, WNL up on observation  -AR     Bed Mobility, Assessment/Treatment    Bed Mob, Supine to Sit, [place] independent  -AR     Bed Mobility,  Comment pt executed logroll without issue  -AR     Transfer Assessment/Treatment    Transfers, Sit-Stand [place] independent  -AR     Transfers, Stand-Sit [place] independent  -AR     Motor Skills/Interventions    Additional Documentation --   BUE GMC&FMC intact evidenced by finger-to-nose&opposition  -AR     Sensory Assessment/Intervention    Sensory Impairment --   BUE sensation intact light touch   -AR     General Interventions    Transfer Training reviewed safe car transafer technique and safe positioning  -AR     Positioning and Restraints    Pre-Treatment Position in bed  -AR     Post Treatment Position other  -AR     Other Position --   standing in room with PT  -AR       03/15/17 1800 03/15/17 0726    General Information    Equipment Currently Used at Home none  -KK     Living Environment    Lives With  spouse  -    Living Arrangements  house  -    Home Accessibility  no concerns  -    Stair Railings at Home  inside, present at both sides  -    Type of Financial/Environmental Concern  none  -    Transportation Available  none  -      03/14/17 1229       General Information    Equipment Currently Used at Home none  -     Living Environment    Lives With spouse  -     Living Arrangements house  -     Home Accessibility bed and bath on same level  -     Stair Railings at Home none  -     Type of Financial/Environmental Concern none  -     Transportation Available car  -       User Key  (r) = Recorded By, (t) = Taken By, (c) = Cosigned By    Initials Name Provider Type    MUNIR Haji, PT Physical Therapist    AR Sandra Saenz, OT Occupational Therapist     Barbara Adamson, RN Registered Nurse    SK Sonja C Kellerman, RN Case Manager    KK Tom Hyatt RN Registered Nurse    JR Rita Santiago RN Registered Nurse          Physical Therapy Education     Title: PT OT SLP Therapies (Active)     Topic: Physical Therapy (Done)     Point: Mobility training  (Done)    Learning Progress Summary    Learner Readiness Method Response Comment Documented by Status   Patient Eager E DAMIR LAUREANO reviewed all precautions and Hep SC 03/16/17 1119 Done               Point: Home exercise program (Done)    Learning Progress Summary    Learner Readiness Method Response Comment Documented by Status   Patient Eager E DAMIR LAUREANO reviewed all precautions and Hep SC 03/16/17 1119 Done               Point: Body mechanics (Done)    Learning Progress Summary    Learner Readiness Method Response Comment Documented by Status   Patient Eager E DAMIR LAUREANO reviewed all precautions and Hep SC 03/16/17 1119 Done               Point: Precautions (Done)    Learning Progress Summary    Learner Readiness Method Response Comment Documented by Status   Patient Eager E DAMIR LAUREANO reviewed all precautions and Hep SC 03/16/17 1119 Done                      User Key     Initials Effective Dates Name Provider Type Discipline    SC 06/19/15 -  Ania Haji, PT Physical Therapist PT                PT Recommendation and Plan  Anticipated Discharge Disposition: home with assist  Planned Therapy Interventions: gait training, home exercise program, postural re-education, patient/family education  PT Frequency: evaluation only  Plan of Care Review  Plan Of Care Reviewed With: patient  Progress: progress toward functional goals as expected  Outcome Summary/Follow up Plan: Recieved all instructions for home exercise and precautions. Going home. No need for equiprment.          IP PT Goals       03/16/17 1122          Gait Training PT LTG    Gait Training Goal PT LTG, Date Established 03/16/17  -SC      Gait Training Goal PT LTG, Time to Achieve 1 day  -SC      Gait Training Goal PT LTG, [place] Level supervision required  -SC      Gait Training Goal PT LTG, Distance to Achieve 350  -SC      Gait Training Goal PT LTG, Outcome goal met  -SC      Patient Education PT LTG    Patient Education PT LTG, Date Established 03/16/17  -SC       Patient Education PT LTG, Time to Achieve 1 day  -SC      Patient Education PT LTG, Education Type written program;HEP;precaution per surgeon;posture/body mechanics;benefits of activity  -SC      Patient Education PT LTG, Education Understanding demonstrate adequately;verbalize understanding  -SC      Patient Education PT LTG Outcome goal met  -SC        User Key  (r) = Recorded By, (t) = Taken By, (c) = Cosigned By    Initials Name Provider Type    SC Ania Haji, PT Physical Therapist                Outcome Measures       03/16/17 0825 03/16/17 0811       How much help from another person do you currently need...    Turning from your back to your side while in flat bed without using bedrails? 4  -SC      Moving from lying on back to sitting on the side of a flat bed without bedrails? 4  -SC      Moving to and from a bed to a chair (including a wheelchair)? 4  -SC      Standing up from a chair using your arms (e.g., wheelchair, bedside chair)? 4  -SC      Climbing 3-5 steps with a railing? 4  -SC      To walk in hospital room? 4  -SC      AM-PAC 6 Clicks Score 24  -SC      How much help from another is currently needed...    Putting on and taking off regular lower body clothing?  3  -AR     Bathing (including washing, rinsing, and drying)  4  -AR     Toileting (which includes using toilet bed pan or urinal)  4  -AR     Putting on and taking off regular upper body clothing  4  -AR     Taking care of personal grooming (such as brushing teeth)  4  -AR     Eating meals  4  -AR     Score  23  -AR     Functional Assessment    Outcome Measure Options AM-PAC 6 Clicks Basic Mobility (PT)  -SC AM-PAC 6 Clicks Daily Activity (OT)  -AR       User Key  (r) = Recorded By, (t) = Taken By, (c) = Cosigned By    Initials Name Provider Type    SC Ania Haji, PT Physical Therapist    AR Sandra Saenz, OT Occupational Therapist           Time Calculation:         PT Charges       03/16/17 1124          Time Calculation     Start Time 0825  -SC      PT Received On 03/16/17  -SC        User Key  (r) = Recorded By, (t) = Taken By, (c) = Cosigned By    Initials Name Provider Type    SC Ania Haji, PT Physical Therapist          Therapy Charges for Today     Code Description Service Date Service Provider Modifiers Qty    56423255738 HC PT MOBILITY CURRENT 3/16/2017 Ania Haji, PT GP, CH 1    45887818319 HC PT MOBILITY PROJECTED 3/16/2017 Ania Haji, PT GP, [place] 1    47089900714 HC PT MOBILITY DISCHARGE 3/16/2017 Ania Haji, PT GP,  1    74881268219 HC PT EVAL MOD COMPLEXITY 3 3/16/2017 Ania Haji, PT GP 1          PT G-Codes  Outcome Measure Options: AM-PAC 6 Clicks Basic Mobility (PT)  Score: 24  Functional Limitation: Mobility: Walking and moving around  Mobility: Walking and Moving Around Current Status (): 0 percent impaired, limited or restricted  Mobility: Walking and Moving Around Goal Status (): 0 percent impaired, limited or restricted  Mobility: Walking and Moving Around Discharge Status (): 0 percent impaired, limited or restricted    PT Discharge Summary  Anticipated Discharge Disposition: home with assist  Reason for Discharge: All goals achieved  Outcomes Achieved: Able to achieve all goals within established timeline  Discharge Destination: Home with assist    Ania Haji PT  3/16/2017

## 2017-03-16 NOTE — PROGRESS NOTES
Discharge Planning Assessment  [facility]     Patient Name: Eugene Ames  MRN: [id number]  Today's Date: 3/16/2017    Admit Date: 3/15/2017          Discharge Needs Assessment       03/16/17 0941    Living Environment    Lives With spouse    Living Arrangements house   two level    Provides Primary Care For no one    Primary Care Provided By spouse/significant other    Quality Of Family Relationships supportive    Able to Return to Prior Living Arrangements yes    Discharge Needs Assessment    Concerns To Be Addressed no discharge needs identified    Readmission Within The Last 30 Days no previous admission in last 30 days    Anticipated Changes Related to Illness none    Equipment Currently Used at Home raised toilet    Equipment Needed After Discharge none    Transportation Available car;family or friend will provide    Discharge Disposition home or self-care            Discharge Plan       03/16/17 0942    Case Management/Social Work Plan    Plan Home    Patient/Family In Agreement With Plan yes    Additional Comments I briefly met with Mr Ames and wife to discuss d/c plan/needs.He plans to return home, wife is home and can assist with any care needs. He was independent with all ADL's prior to admit, uses no equipment.He has insurance coveraage for Rx meds. No d/c needs identifed.        Discharge Placement     No information found        Expected Discharge Date and Time     Expected Discharge Date Expected Discharge Time    Mar 16, 2017               Demographic Summary       03/16/17 0907    Referral Information    Admission Type outpatient in a bed    Arrived From home or self-care    Referral Source physician    Reason For Consult discharge planning    Record Reviewed medical record    Contact Information    Permission Granted to Share Information With ;family/designee    Primary Care Physician Information    Name Kahlil Lombardo            Functional Status       03/16/17 0940    Functional  Status Prior    Ambulation 0-->independent    Transferring 0-->independent    Toileting 0-->independent    Bathing 0-->independent    Dressing 0-->independent    Eating 0-->independent    Communication 0-->understands/communicates without difficulty    Swallowing 0-->swallows foods/liquids without difficulty    IADL    Medications independent    Meal Preparation independent    Housekeeping independent    Laundry independent    Shopping independent    Oral Care independent    Cognitive/Perceptual/Developmental    Current Mental Status/Cognitive Functioning no deficits noted    Recent Changes in Mental Status/Cognitive Functioning no changes    Employment/Financial    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

## 2017-03-16 NOTE — PLAN OF CARE
Problem: Patient Care Overview (Adult)  Goal: Plan of Care Review  Outcome: Outcome(s) achieved Date Met:  03/16/17 03/16/17 0925   Coping/Psychosocial Response Interventions   Plan Of Care Reviewed With patient;spouse   Outcome Evaluation   Outcome Summary/Follow up Plan OT evaluation complete and pt/family education complete. AE issued. Pt has no DME needs at this time. Recommend DC home with assist from spouse.          Problem: Laminectomy/Foraminotomy/Discectomy (Adult)  Goal: Signs and Symptoms of Listed Potential Problems Will be Absent or Manageable (Laminectomy/Foraminotomy/Discectomy)  Outcome: Outcome(s) achieved Date Met:  03/16/17 03/16/17 0925   Laminectomy/Foraminotomy/Discectomy   Problems Assessed (Laminectomy/Laminotomy/Discectomy) functional decline/self-care deficit   Problems Present (Laminectomy/Laminotomy/Discectomy) functional decline/self-care deficit         Problem: Inpatient Occupational Therapy  Goal: Bed Mobility Goal LTG- OT  Outcome: Outcome(s) achieved Date Met:  03/16/17 03/16/17 0925   Bed Mobility OT LTG   Bed Mobility OT LTG, Date Established 03/16/17   Bed Mobility OT LTG, Time to Achieve by discharge   Bed Mobility OT LTG, Activity Type supine to sit/sit to supine  (via logroll technique)   Bed Mobility OT LTG, [place] Level verbal cues required;supervision required   Bed Mobility OT LTG, Outcome goal met       Goal: Transfer Training Goal 1 LTG- OT  Outcome: Outcome(s) achieved Date Met:  03/16/17 03/16/17 0925   Transfer Training OT LTG   Transfer Training OT LTG, Date Established 03/16/17   Transfer Training OT LTG, Time to Achieve by discharge   Transfer Training OT LTG, Activity Type sit to stand/stand to sit   Transfer Training OT LTG, [place] Level verbal cues required;supervision required       Goal: Patient Education Goal LTG- OT  Outcome: Outcome(s) achieved Date Met:  03/16/17 03/16/17 0925   Patient Education OT LTG   Patient Education OT  LTG, Date Established 03/16/17   Patient Education OT LTG, Time to Achieve by discharge   Patient Education OT LTG, Education Type precautions per surgeon;positioning;home safety;adaptive equipment mgmt   Patient Education OT LTG, Education Understanding demonstrates adequately;verbalizes understanding

## 2017-03-16 NOTE — PLAN OF CARE
Problem: Patient Care Overview (Adult)  Goal: Plan of Care Review  Outcome: Outcome(s) achieved Date Met:  03/16/17 03/16/17 1122   Coping/Psychosocial Response Interventions   Plan Of Care Reviewed With patient   Patient Care Overview   Progress progress toward functional goals as expected   Outcome Evaluation   Outcome Summary/Follow up Plan Recieved all instructions for home exercise and precautions. Going home. No need for equiprment.         Problem: Perioperative Period (Adult)  Goal: Signs and Symptoms of Listed Potential Problems Will be Absent or Manageable (Perioperative Period)  Outcome: Outcome(s) achieved Date Met:  03/16/17 03/16/17 1122   Perioperative Period   Problems Assessed (Perioperative Period) pain   Problems Present (Perioperative Period) none         Problem: Inpatient Physical Therapy  Goal: Gait Training Goal LTG- PT  Outcome: Outcome(s) achieved Date Met:  03/16/17 03/16/17 1122   Gait Training PT LTG   Gait Training Goal PT LTG, Date Established 03/16/17   Gait Training Goal PT LTG, Time to Achieve 1 day   Gait Training Goal PT LTG, [place] Level supervision required   Gait Training Goal PT LTG, Distance to Achieve 350   Gait Training Goal PT LTG, Outcome goal met       Goal: Patient Education Goal LTG- PT  Outcome: Outcome(s) achieved Date Met:  03/16/17 03/16/17 1122   Patient Education PT LTG   Patient Education PT LTG, Date Established 03/16/17   Patient Education PT LTG, Time to Achieve 1 day   Patient Education PT LTG, Education Type written program;HEP;precaution per surgeon;posture/body mechanics;benefits of activity   Patient Education PT LTG, Education Understanding demonstrate adequately;verbalize understanding   Patient Education PT LTG Outcome goal met

## 2017-03-16 NOTE — DISCHARGE SUMMARY
Date of Discharge:  3/16/2017    Kahlil Lombardo MD  @CARE TEAM@    Discharge Diagnosis: Recurrent herniated intervertebral disc L4-L5; left    Procedures Performed  Procedure(s):  LEFT  RE-DO LUMBAR DISECTOMY  L4-5       Summary of Hospitalization: This 66-year-old male was admitted to hospital for elective excision of a recurrent herniated disc at L4-L5 on the left side manifesting as severe relentless pain and weakness in his left leg in the distribution of the fifth lumbar nerve root which was confirmed by MRI.  He failed to respond to conservativism and surgery was recommended.    The surgical findings included fragmentation of disc material associated with rather extensive adhesions and scar formation.    He did well postoperatively with alleviation of his symptoms and was discharged on the following day.    Condition on Discharge:  Satisfactory Discharge Disposition      Discharge Medications   Eugene Ames   Home Medication Instructions [place]:[id number]    Printed on:03/16/17 0703   Medication Information                      docusate sodium (COLACE) 50 MG capsule  Take 2 capsules by mouth 2 (Two) Times a Day.             gabapentin (NEURONTIN) 100 MG capsule  Start 1 tab QHS for 3 days, then, BID for 3 days, then, TID for 3 days, then, cont. QID             methocarbamol (ROBAXIN) 750 MG tablet  Take 1 tablet by mouth 3 (Three) Times a Day.             nortriptyline (PAMELOR) 10 MG capsule  Take 1 capsule by mouth At Night As Needed for sleep. 1-2 TABLETS AT BEDTIME             oxyCODONE-acetaminophen (PERCOCET)  MG per tablet  Take 1 tablet by mouth Every 6 (Six) Hours As Needed for moderate pain (4-6).             oxyCODONE-acetaminophen (PERCOCET)  MG per tablet  Take 1 tablet by mouth Every 6 (Six) Hours As Needed for Moderate Pain (4-6).                   Follow-up Appointments  No future appointments.  Additional Instructions for the Follow-ups that You Need to Schedule     Discharge  Follow-Up With Specified Provider    As directed    To:  Neurosurgery   Follow Up:  6 Weeks   Follow Up Details:  [facility]david Sun MD  03/16/17  7:03 AM

## 2017-03-16 NOTE — PLAN OF CARE
Problem: Patient Care Overview (Adult)  Goal: Plan of Care Review    03/16/17 0330   Coping/Psychosocial Response Interventions   Plan Of Care Reviewed With patient   Patient Care Overview   Progress improving   Outcome Evaluation   Outcome Summary/Follow up Plan Pt alert and orient x4, VSS, minimal c/o pain managed with PRN pain medication, pt ambulated with stand by assist approx 300 ft this shift, denies numbness/tingling. Dressing on back is CDI.        Goal: Discharge Needs Assessment    03/16/17 0330   Discharge Needs Assessment   Concerns To Be Addressed no discharge needs identified   Readmission Within The Last 30 Days no previous admission in last 30 days   Equipment Needed After Discharge none   Self-Care   Equipment Currently Used at Home none   Current Health   Anticipated Changes Related to Illness none         Problem: Laminectomy/Foraminotomy/Discectomy (Adult)  Intervention: Promote Pulmonary Hygiene and Secretion Clearance    03/15/17 2143   Incentive Spirometer   Administration (Incentive Spirometer) done with encouragement       Intervention: Promote Effective Elimination    03/15/17 2143   Manage Acute Burn Pain   Bowel Intervention ambulation promoted       Intervention: Prevent/Manage DVT/VTE Risk    03/16/17 0200   Support Surgical/Anesthesia Recovery   Venous Thromboembolism Prevent/Manage bilateral;compression stockings on;sequential compression devices on         Goal: Signs and Symptoms of Listed Potential Problems Will be Absent or Manageable (Laminectomy/Foraminotomy/Discectomy)    03/16/17 0330   Laminectomy/Foraminotomy/Discectomy   Problems Assessed (Laminectomy/Laminotomy/Discectomy) all   Problems Present (Laminectomy/Laminotomy/Discectomy) pain;situational response

## 2017-04-25 ENCOUNTER — OFFICE VISIT (OUTPATIENT)
Dept: NEUROSURGERY | Facility: CLINIC | Age: 67
End: 2017-04-25

## 2017-04-25 VITALS
HEIGHT: 68 IN | SYSTOLIC BLOOD PRESSURE: 132 MMHG | BODY MASS INDEX: 25.31 KG/M2 | TEMPERATURE: 97.6 F | WEIGHT: 167 LBS | DIASTOLIC BLOOD PRESSURE: 90 MMHG

## 2017-04-25 DIAGNOSIS — M51.26 HNP (HERNIATED NUCLEUS PULPOSUS), LUMBAR: Primary | ICD-10-CM

## 2017-04-25 PROCEDURE — 99024 POSTOP FOLLOW-UP VISIT: CPT | Performed by: PHYSICIAN ASSISTANT

## 2017-04-25 NOTE — PROGRESS NOTES
Eugene Ames    1950    04/25/2017    [id number]      Chief Complaint   Patient presents with   • Post-op     HPI:  Patient is status post excision of a recurrent herniated disc at L4 5 on the left on 3/15/2017.  The patient's initial discectomy was approximately 1 year ago.  On today's follow-up the patient reports he is had complete resolution of his leg pain, he feels tight in his hamstrings bilaterally.  The patient has continued to do his postop exercises and he has started doing some mild stretches and started walking short distances yesterday.  Overall he is doing very well.    Past Medical History:   Diagnosis Date   • Allergic rhinitis    • ED (erectile dysfunction)    • Extremity pain    • Joint pain    • Low back pain    • Lumbosacral disc disease    • Weakness of left lower extremity    • Wears glasses        No Known Allergies    No current outpatient prescriptions on file.  No current facility-administered medications for this visit.     Facility-Administered Medications Ordered in Other Visits:   •  atracurium injection, , Intravenous, PRN, Cheryl Grady CRNA, 50 mg at 03/15/17 0813  •  dexamethasone (DECADRON) injection, , Intravenous, PRN, Cheryl Grady CRNA, 8 mg at 03/15/17 0828  •  ePHEDrine injection, , Intravenous, PRN, Cheryl Grady CRNA, 5 mg at 03/15/17 0832  •  FentaNYL Citrate (PF) (SUBLIMAZE) injection, , Intravenous, PRN, Cheryl Grady CRNA, 25 mcg at 03/15/17 1025  •  glycopyrrolate (ROBINUL) injection, , Intravenous, PRN, Cheryl Grady CRNA, 0.4 mg at 03/15/17 1001  •  lidocaine PF 1% (XYLOCAINE) injection, , , PRN, Cheryl Grady CRNA, 50 mg at 03/15/17 0813  •  midazolam (VERSED) injection, , Intravenous, PRN, Cheryl Grady CRNA, 2 mg at 03/15/17 0807  •  neostigmine injection, , Intravenous, PRN, Cheryl Grady CRNA, 3 mg at 03/15/17 1001  •  ondansetron (ZOFRAN) injection, , Intravenous, PRN, Cheryl Grady CRNA, 4 mg at  03/15/17 0957  •  phenylephrine (SYBIL-SYNEPHRINE) 50 mg in sodium chloride 0.9 % 250 mL infusion, 50 mg, , Continuous PRN, Cheryl Grady CRNA, Last Rate: 23 mL/hr at 03/15/17 0851, 1 mcg/kg/min at 03/15/17 0851  •  phenylephrine (SYBIL-SYNEPHRINE) injection, , Intravenous, PRN, Cheryl Grady CRNA, 80 mcg at 03/15/17 0846  •  Propofol (DIPRIVAN) injection, , Intravenous, PRN, Cheryl Grady CRNA, 200 mg at 03/15/17 0813    Social History     Social History   • Marital status:      Spouse name: N/A   • Number of children: N/A   • Years of education: N/A     Social History Main Topics   • Smoking status: Never Smoker   • Smokeless tobacco: Never Used   • Alcohol use No   • Drug use: No   • Sexual activity: Not Asked     Other Topics Concern   • None     Social History Narrative       Family History   Problem Relation Age of Onset   • Cancer Mother    • Heart attack Father        Review of Systems   Constitutional: Negative for activity change, appetite change, chills, diaphoresis, fatigue, fever and unexpected weight change.   HENT: Negative for congestion, dental problem, drooling, ear discharge, ear pain, facial swelling, hearing loss, mouth sores, nosebleeds, postnasal drip, rhinorrhea, sinus pressure, sneezing, sore throat, tinnitus, trouble swallowing and voice change.    Eyes: Negative for photophobia, pain, discharge, redness, itching and visual disturbance.   Respiratory: Negative for apnea, cough, choking, chest tightness, shortness of breath, wheezing and stridor.    Cardiovascular: Negative for chest pain, palpitations and leg swelling.   Gastrointestinal: Negative for abdominal distention, abdominal pain, anal bleeding, blood in stool, constipation, diarrhea, nausea, rectal pain and vomiting.   Endocrine: Negative for cold intolerance, heat intolerance, polydipsia, polyphagia and polyuria.   Genitourinary: Negative for decreased urine volume, difficulty urinating, dysuria, enuresis,  flank pain, frequency, genital sores, hematuria and urgency.   Musculoskeletal: Negative for arthralgias, back pain, gait problem, joint swelling, myalgias, neck pain and neck stiffness.   Skin: Negative for color change, pallor, rash and wound.   Allergic/Immunologic: Negative for environmental allergies, food allergies and immunocompromised state.   Neurological: Negative for dizziness, tremors, seizures, syncope, facial asymmetry, speech difficulty, weakness, light-headedness, numbness and headaches.   Hematological: Negative for adenopathy. Does not bruise/bleed easily.   Psychiatric/Behavioral: Negative for agitation, behavioral problems, confusion, decreased concentration, dysphoric mood, hallucinations, self-injury, sleep disturbance and suicidal ideas. The patient is not nervous/anxious and is not hyperactive.          PE:  Physical Exam   Surgical wound is well-healed.    Neurologic Exam  Motor examination in the lower extremities is intact without any evidence of weakness to direct testing.  Straight leg raising is negative.  The patient is tight in his hamstrings bilaterally.    MDM   The patient will continue with his walking program and stretches.  Our plan will be follow-up in approximately 6 weeks with Dr. Sun.  The patient is being very cautious with his bending and twisting.  I've asked the patient to give us call the office should any problems arise.    Matilda Hanson PA-C

## 2017-06-15 ENCOUNTER — OFFICE VISIT (OUTPATIENT)
Dept: NEUROSURGERY | Facility: CLINIC | Age: 67
End: 2017-06-15

## 2017-06-15 VITALS
BODY MASS INDEX: 25.16 KG/M2 | WEIGHT: 166 LBS | DIASTOLIC BLOOD PRESSURE: 80 MMHG | HEIGHT: 68 IN | SYSTOLIC BLOOD PRESSURE: 130 MMHG | TEMPERATURE: 98 F

## 2017-06-15 DIAGNOSIS — Z98.890 S/P LUMBAR DISCECTOMY: Primary | ICD-10-CM

## 2017-06-15 PROCEDURE — 99024 POSTOP FOLLOW-UP VISIT: CPT | Performed by: NEUROLOGICAL SURGERY

## 2017-06-15 NOTE — PATIENT INSTRUCTIONS
Call Dr. Sun on a Monday or Tuesday.   Ask for Naa,  and leave a message for  Dr. Sun.  He will call you back at the end of the day as soon as he can.     [phone]

## 2017-06-15 NOTE — PROGRESS NOTES
Eugene Ames  1950  [id number]                       CURRENT WORKING DIAGNOSIS:  Status post discectomy L4-L5          MEDICAL HISTORY SINCE LAST ENCOUNTER:  Is here for his last encounter and doing quite well.  He has occasional numbness in L5 on the left side but all along was markedly better with no symptoms at this time.           Past Medical History:   Diagnosis Date   • Allergic rhinitis    • ED (erectile dysfunction)    • Extremity pain    • Joint pain    • Low back pain    • Lumbosacral disc disease    • Weakness of left lower extremity    • Wears glasses               Past Surgical History:   Procedure Laterality Date   • COLONOSCOPY  2012    X2   • LUMBAR DISCECTOMY  08/07/2015    Left L4/5 ([place])   • LUMBAR DISCECTOMY Left 3/15/2017    Procedure: LEFT  RE-DO LUMBAR DISECTOMY  L4-5;  Surgeon: Gilson Sun MD;  Location: [facility];  Service:               Family History   Problem Relation Age of Onset   • Cancer Mother    • Heart attack Father               Social History     Social History   • Marital status:      Spouse name: N/A   • Number of children: N/A   • Years of education: N/A     Occupational History   • Not on file.     Social History Main Topics   • Smoking status: Never Smoker   • Smokeless tobacco: Never Used   • Alcohol use No   • Drug use: No   • Sexual activity: Not on file     Other Topics Concern   • Not on file     Social History Narrative            No Known Allergies         No current outpatient prescriptions on file.  No current facility-administered medications for this visit.     Facility-Administered Medications Ordered in Other Visits:   •  atracurium injection, , Intravenous, PRN, Cheryl Grady CRNA, 50 mg at 03/15/17 0813  •  dexamethasone (DECADRON) injection, , Intravenous, PRN, Cheryl Grady CRNA, 8 mg at 03/15/17 0828  •  ePHEDrine injection, , Intravenous, PRN, Cheryl Grady CRNA, 5 mg at 03/15/17 0832  •  FentaNYL Citrate (PF)  (SUBLIMAZE) injection, , Intravenous, PRN, Cheryl Grady CRNA, 25 mcg at 03/15/17 1025  •  glycopyrrolate (ROBINUL) injection, , Intravenous, PRN, Crystal Belinda Grady, CRNA, 0.4 mg at 03/15/17 1001  •  lidocaine PF 1% (XYLOCAINE) injection, , , PRN, Crystal Belinda Grady, CRNA, 50 mg at 03/15/17 0813  •  midazolam (VERSED) injection, , Intravenous, PRN, Crystal Belinda Grady, CRNA, 2 mg at 03/15/17 0807  •  neostigmine injection, , Intravenous, PRN, Cheryl Grady, CRNA, 3 mg at 03/15/17 1001  •  ondansetron (ZOFRAN) injection, , Intravenous, PRN, Cheryl Grady, CRNA, 4 mg at 03/15/17 0957  •  phenylephrine (SYBIL-SYNEPHRINE) 50 mg in sodium chloride 0.9 % 250 mL infusion, 50 mg, , Continuous PRN, Cheryl Grady CRNA, Last Rate: 23 mL/hr at 03/15/17 0851, 1 mcg/kg/min at 03/15/17 0851  •  phenylephrine (SYBIL-SYNEPHRINE) injection, , Intravenous, PRN, Cheryl Grady CRNA, 80 mcg at 03/15/17 0846  •  Propofol (DIPRIVAN) injection, , Intravenous, PRN, Cheryl Grady, CRNA, 200 mg at 03/15/17 0813         Review of Systems   Constitutional: Negative for activity change, appetite change, chills, diaphoresis, fatigue, fever and unexpected weight change.   HENT: Negative for congestion, dental problem, drooling, ear discharge, ear pain, facial swelling, hearing loss, mouth sores, nosebleeds, postnasal drip, rhinorrhea, sinus pressure, sneezing, sore throat, tinnitus, trouble swallowing and voice change.    Eyes: Negative for photophobia, pain, discharge, redness, itching and visual disturbance.   Respiratory: Negative for apnea, cough, choking, chest tightness, shortness of breath, wheezing and stridor.    Cardiovascular: Negative for chest pain, palpitations and leg swelling.   Gastrointestinal: Negative for abdominal distention, abdominal pain, anal bleeding, blood in stool, constipation, diarrhea, nausea, rectal pain and vomiting.   Musculoskeletal: Negative for arthralgias, back pain, gait problem,  "joint swelling, myalgias, neck pain and neck stiffness.   Skin: Negative for color change, pallor, rash and wound.   Allergic/Immunologic: Negative for environmental allergies, food allergies and immunocompromised state.   Neurological: Negative for dizziness, tremors, seizures, syncope, facial asymmetry, speech difficulty, weakness, light-headedness, numbness and headaches.   Hematological: Negative for adenopathy. Does not bruise/bleed easily.   Psychiatric/Behavioral: Negative for agitation, behavioral problems, confusion, decreased concentration, dysphoric mood, self-injury, sleep disturbance and suicidal ideas. The patient is not nervous/anxious and is not hyperactive.                Vitals:    06/15/17 0950   BP: 130/80   Temp: 98 °F (36.7 °C)   Weight: 166 lb (75.3 kg)   Height: 68\" (172.7 cm)               EXAMINATION: Straight leg raising, [place] and flip test are negative.  His strength is superb including anterior tibialis and extensor hallucis longus.  He has slight decreased sensation L5 on the left as compared to the right.  Reflexes are easily elicitable.            MEDICAL DECISION MAKING: Status post discectomy L4-L5, left           ASSESSMENT/DISPOSITION: He is achieve maximum improvement at this time.  I've relinquished his care yet will have him check in with me on as-needed basis.              I APPRECIATE THE OPPORTUNITY OF THIS REFERRAL. PLEASE CALL IF ANY       QUESTIONS [phone]  Scribed for Gilson Sun MD by Sacha Yung CMA. 6/15/2017  10:11 AM      I have read and concur with the information provided by the scribe.  Gilson Sun MD      "

## 2018-04-20 ENCOUNTER — TRANSCRIBE ORDERS (OUTPATIENT)
Dept: DIABETES SERVICES | Facility: HOSPITAL | Age: 68
End: 2018-04-20

## 2018-04-20 DIAGNOSIS — R73.01 IMPAIRED FASTING GLUCOSE: Primary | ICD-10-CM

## 2018-05-30 ENCOUNTER — APPOINTMENT (OUTPATIENT)
Dept: DIABETES SERVICES | Facility: HOSPITAL | Age: 68
End: 2018-05-30

## (undated) DEVICE — TAPE MICROFM 2IN LF

## (undated) DEVICE — MEDI-VAC YANKAUER SUCTION HANDLE W/BULBOUS TIP: Brand: CARDINAL HEALTH

## (undated) DEVICE — ENCORE® LATEX MICRO SIZE 7.5, STERILE LATEX POWDER-FREE SURGICAL GLOVE: Brand: ENCORE

## (undated) DEVICE — DRSNG TELFA PAD NONADH STR 1S 3X8IN

## (undated) DEVICE — FLOSEAL MATRIX IS INDICATED IN SURGICAL PROCEDURES (OTHER THAN IN OPHTHALMIC) AS AN ADJUNCT TO HEMOSTASIS WHEN CONTROL OF BLEEDING BY LIGATURE OR CONVENTIONALPROCEDURES IS INEFFECTIVE OR IMPRACTICAL.: Brand: FLOSEAL HEMOSTATIC MATRIX

## (undated) DEVICE — 3M™ STERI-STRIP™ REINFORCED ADHESIVE SKIN CLOSURES, R1547, 1/2 IN X 4 IN (12 MM X 100 MM), 6 STRIPS/ENVELOPE: Brand: 3M™ STERI-STRIP™

## (undated) DEVICE — AIRWY 90MM NO9

## (undated) DEVICE — NDL SPINE 18G 31/2IN PNK

## (undated) DEVICE — CORD BIPOLAR IRRIGATING DISP

## (undated) DEVICE — ENCORE® LATEX MICRO SIZE 6.5, STERILE LATEX POWDER-FREE SURGICAL GLOVE: Brand: ENCORE

## (undated) DEVICE — PK NEURO DISC 10

## (undated) DEVICE — CANNULA,ADULT,SOFT-TOUCH,7TUBE,SC: Brand: MEDLINE

## (undated) DEVICE — ENCORE® LATEX MICRO SIZE 7, STERILE LATEX POWDER-FREE SURGICAL GLOVE: Brand: ENCORE

## (undated) DEVICE — ELECTRD BLD EZ CLN STD 2.5IN

## (undated) DEVICE — SOL LR 1000ML

## (undated) DEVICE — ANTIBACTERIAL UNDYED BRAIDED (POLYGLACTIN 910), SYNTHETIC ABSORBABLE SUTURE: Brand: COATED VICRYL

## (undated) DEVICE — MEDI-VAC NON-CONDUCTIVE SUCTION TUBING: Brand: CARDINAL HEALTH

## (undated) DEVICE — ADHS LIQ MASTISOL 2/3ML

## (undated) DEVICE — SPNG GZ WOVN 4X4IN 12PLY 10/BX STRL

## (undated) DEVICE — TOOL 14MH30 LEGEND 14CM 3MM: Brand: MIDAS REX ™

## (undated) DEVICE — ACCY PA700 LUBRICANT DIFFUSER MR7 4 PACK: Brand: MIDAS REX

## (undated) DEVICE — ULTRACLEAN ACCESSORY ELECTRODE 4" (10.16 CM) COATED BLADE: Brand: ULTRACLEAN

## (undated) DEVICE — 3M™ STERI-DRAPE™ INSTRUMENT POUCH 1018: Brand: STERI-DRAPE™